# Patient Record
Sex: FEMALE | Race: BLACK OR AFRICAN AMERICAN | NOT HISPANIC OR LATINO | Employment: OTHER | URBAN - METROPOLITAN AREA
[De-identification: names, ages, dates, MRNs, and addresses within clinical notes are randomized per-mention and may not be internally consistent; named-entity substitution may affect disease eponyms.]

---

## 2017-01-03 DIAGNOSIS — R76.8 ANA POSITIVE: Primary | ICD-10-CM

## 2017-01-10 ENCOUNTER — ANESTHESIA EVENT (OUTPATIENT)
Dept: SURGERY | Facility: HOSPITAL | Age: 82
End: 2017-01-10
Payer: MEDICARE

## 2017-01-10 ENCOUNTER — HOSPITAL ENCOUNTER (OUTPATIENT)
Dept: CARDIOLOGY | Facility: CLINIC | Age: 82
Discharge: HOME | End: 2017-01-10
Payer: MEDICARE

## 2017-01-10 ENCOUNTER — OFFICE VISIT (OUTPATIENT)
Dept: UROLOGY | Facility: CLINIC | Age: 82
End: 2017-01-10
Payer: MEDICARE

## 2017-01-10 VITALS — HEIGHT: 63 IN | WEIGHT: 183.88 LBS | BODY MASS INDEX: 32.58 KG/M2

## 2017-01-10 DIAGNOSIS — N28.89 RENAL MASS, RIGHT: ICD-10-CM

## 2017-01-10 DIAGNOSIS — E11.29 TYPE 2 DIABETES MELLITUS WITH OTHER KIDNEY COMPLICATION, UNSPECIFIED LONG TERM INSULIN USE STATUS: ICD-10-CM

## 2017-01-10 DIAGNOSIS — N20.1 RIGHT URETERAL STONE: ICD-10-CM

## 2017-01-10 DIAGNOSIS — N39.0 COMPLICATED UTI (URINARY TRACT INFECTION): Primary | ICD-10-CM

## 2017-01-10 DIAGNOSIS — Z01.818 PREOP TESTING: ICD-10-CM

## 2017-01-10 LAB
BILIRUB SERPL-MCNC: NORMAL MG/DL
BLOOD URINE, POC: NORMAL
COLOR, POC UA: NORMAL
GLUCOSE UR QL STRIP: NORMAL
KETONES UR QL STRIP: NORMAL
LEUKOCYTE ESTERASE URINE, POC: NORMAL
NITRITE, POC UA: NORMAL
PH, POC UA: 5
PROTEIN, POC: NORMAL
SPECIFIC GRAVITY, POC UA: 1.01
UROBILINOGEN, POC UA: NORMAL

## 2017-01-10 PROCEDURE — 87086 URINE CULTURE/COLONY COUNT: CPT

## 2017-01-10 PROCEDURE — 99999 PR PBB SHADOW E&M-EST. PATIENT-LVL III: CPT | Mod: PBBFAC,,, | Performed by: UROLOGY

## 2017-01-10 PROCEDURE — 1159F MED LIST DOCD IN RCRD: CPT | Mod: S$GLB,,, | Performed by: UROLOGY

## 2017-01-10 PROCEDURE — 81001 URINALYSIS AUTO W/SCOPE: CPT | Mod: S$GLB,,, | Performed by: UROLOGY

## 2017-01-10 PROCEDURE — 1160F RVW MEDS BY RX/DR IN RCRD: CPT | Mod: S$GLB,,, | Performed by: UROLOGY

## 2017-01-10 PROCEDURE — 87186 SC STD MICRODIL/AGAR DIL: CPT

## 2017-01-10 PROCEDURE — 87077 CULTURE AEROBIC IDENTIFY: CPT

## 2017-01-10 PROCEDURE — 1126F AMNT PAIN NOTED NONE PRSNT: CPT | Mod: S$GLB,,, | Performed by: UROLOGY

## 2017-01-10 PROCEDURE — 99215 OFFICE O/P EST HI 40 MIN: CPT | Mod: 25,S$GLB,, | Performed by: UROLOGY

## 2017-01-10 PROCEDURE — 93000 ELECTROCARDIOGRAM COMPLETE: CPT | Mod: S$GLB,,, | Performed by: INTERNAL MEDICINE

## 2017-01-10 PROCEDURE — 87088 URINE BACTERIA CULTURE: CPT

## 2017-01-10 PROCEDURE — 1157F ADVNC CARE PLAN IN RCRD: CPT | Mod: S$GLB,,, | Performed by: UROLOGY

## 2017-01-10 RX ORDER — CEPHALEXIN 500 MG/1
500 CAPSULE ORAL EVERY 12 HOURS
Qty: 20 CAPSULE | Refills: 0 | Status: SHIPPED | OUTPATIENT
Start: 2017-01-10 | End: 2017-01-12

## 2017-01-10 NOTE — ANESTHESIA PREPROCEDURE EVALUATION
Anesthesia Assessment: Preoperative EQUATION     Planned Procedure: Procedure(s) (LRB):  EXTRACTION-STONE-URETEROSCOPY (Right)  LITHOTRIPSY-LASER (Right)  REPLACEMENT-STENT (Right)  REMOVAL-STENT (Right)  PYELOGRAM-RETROGRADE (Right)  Requested Anesthesia Type:General  Surgeon: Malka Blair MD  Service: Urology  Known or anticipated Date of Surgery:1/16/2017     Surgeon notes: reviewed     Electronic QUestionnaire Assessment completed via nurse interview with patient.         Sakshi Marcial [[id number]] - 84 y.o. Female         Providers Outside of [facility]             Pre-admit from 1/16/2017 in [facility]      Has outside PCP   Yes [[facility]]      Has other outside provider   Yes        Surgical Risk Level       Surgical Risk Level:   2              caRDScore (Clinical Anesthesia Rapid Decision Score)         Low  Total Score: 14       14 Sum of Clinical Scores        caRDScores (Grouped)       caRDScore - Ane:   1                       caRDScore - CVD:   2                       caRDScore - Pul:   3                       caRDScore - Met:   8                       caRDScore - Phy:   0              caRDScore Items             Pre-admit from 1/16/2017 in [facility]      Anesthesia        Do you wake up frequently with an arm or leg temporarily numb or asleep?   Yes      CVD        Activity similar to best ability for maximal activity or exercise   METS 4      Diagnosed with high blood pressure   Yes      Typical BP runs <150/90   Yes      Known heart murmur / could be due to valve abnormality   Yes [mitral calcification, systolic murmur]      Pulmonary        Total smoking adds up to 20 - 40 years   Yes      During most of those years, smoked 1 pack per day   Yes [1/2 pk/day]      Metabolic        Has kidney problem, followed by a nephrologist   Yes      Has been diagnosed with CKD   Yes      Type 2 Diabetes   Yes      Is on oral Rx and/or non-insulin injectable for  diabetes   No [Was taken off meds. BS good]      Has hyperlipoproteinemia   Yes      Physiologic        Obesity Status   Mild Obesity (BMI 30-34.9)        Flags       Red Flag Score:   2                       Yellow Flag Score:   6              Red Flags             Pre-admit from 1/16/2017 in [facility]      Obesity Status   Mild Obesity (BMI 30-34.9)      Has kidney problem, followed by a nephrologist   Yes      Has been diagnosed with CKD   Yes        Yellow Flags             Pre-admit from 1/16/2017 in [facility]      Has had surgery within the last 3 months   Yes      Takes herbal medications or vitamin supplements   Yes      Obesity Status   Mild Obesity (BMI 30-34.9)      Is or has been a Smoker   Yes      Aspirin   Yes      Known heart murmur / could be due to valve abnormality   Yes [mitral calcification, systolic murmur]      Do you wake up frequently with an arm or leg temporarily numb or asleep?   Yes        PONV Risk Score (assumes periop narcotic use = +1, Max=4)       PONV Risk Score:   3              PONV Risk Factors  Total Score: 2       1 Female      1 Non-Smoker at present        Sleep Apnea  Total Score: 0         KAREN STOP-Bang Risk Factors (Max=8)  Total Score: 2       1 Takes medication for high blood pressure      1 Age >50        KAREN Risk Level - 1 (Low), 2 (Moderate), 3 (High)       KAREN Risk Level:   1              RCRI (Revised Cardiac Risk Indices of ACC/AHA guidelines, Max=6)  Total Score: 0         CAD Risk Factors  Total Score: 5       1 Female. Age >55      1 Total smoking adds up to 20 - 40 years      1 Diagnosed with high blood pressure      1 Has Diabetes      1 Has hyperlipoproteinemia        CHADS Score if applicable (history of atrial fib/flutter, Max=6)  Total Score: 3       1 Age >75      1 Diagnosed with high blood pressure      1 Has Diabetes        Maximal Exercise Capacity             [facility]      Maximal Exercise Capacity   METS 4        Summary of Dependence  Total Score: 1       1 Is totally independent of others for activities of daily living        Phone Fraility Score (Max = 17)  Total Score: 3       1 Uses 5 or more meds on reg basis      1 Describes health as Fair      1 Has a problem losing control of urine        Pain Factors       No data to display        Risk Triggers (Evidence-Based Risk Triggers)         Pulmonary Risk Triggers  Total Score: 3       1 Age > or = 60      1 Obesity Status: Mild Obesity (BMI 30-34.9)      1 Total smoking adds up to 20 - 40 years        Renal Risk Triggers  Total Score: 4       1 Diagnosed with high blood pressure      1 Has kidney problem, followed by a nephrologist      1 Has been diagnosed with CKD      1 Type 2 Diabetes        Delirium Risk Triggers  Total Score: 1       1 Age > or = 75        Urologic Risk Triggers  Total Score: 1       1 Has a problem losing control of urine        Logistics         Pre-op Clinic Logistics  Total Score: 10       1 Has had surgery within the last 3 months      1 Has outside PCP      1 Has other outside provider      2 Takes herbal medications or vitamin supplements      1 Has had anesthesia, either as adult or as a child      1 Takes medication for high blood pressure      1 Known heart murmur / could be due to valve abnormality      1 Has kidney problem, followed by a nephrologist      1 Has been diagnosed with CKD        DOSC Logistics  Total Score: 1       1 Has been diagnosed with CKD        Discharge Logistics  Total Score: 0         Discharge Planning             Pre-admit from 1/16/2017 in [facility]      Discharge Planning        Discharge plans   Home with assistance      Will assist patient 24/7, if needed   -- []        Anes <For office use only>       Total Score: 12          Surgical Risk Level Assessment                       Triage considerations:      The patient  has no apparent active cardiac condition (No unstable coronary Syndrome such as severe unstable angina or recent [<1 month] myocardial infarction, decompensated CHF, severe valvular disease or significant arrhythmia)     Previous anesthesia records:[facility] 12/16/16     Last PCP note: within 1 month , outside [facility]  1/9/17  Subspecialty notes: Nephrology     Other important co-morbidities: HTN, HLD, CKD, DM-2, Aortic Atherosclerosis      Tests already available:  Available tests, within 1 month , within [facility] . 12/2016 CBC, CMP, Mag, X-Ray Abd, CT Abd/Pelvis, US Renal Artery/Vein. See Media for more outside records.  Instructions given. (See in Nurse's note)     Optimization:      Plan:   Testing: EKG  Patient has previously scheduled Medical Appointment: 1/10 Dr Blair-Urology     Navigation: Tests Scheduled. EKG 1/10                       1/11 EKG results reviewed      Results will be tracked by Preop Clinic.     Shanice Jimenez RN                                                                                                                     01/10/2017  Sakshi Marcial is a 84 y.o., female.    [facility] Anesthesia Evaluation    I have reviewed the Patient Summary Reports.    I have reviewed the Nursing Notes.   I have reviewed the Medications.     Review of Systems  Anesthesia Hx:  No problems with previous Anesthesia    Cardiovascular:   Hypertension    Renal/:   Chronic Renal Disease    Endocrine:   Diabetes, type 2        Physical Exam  General:  Well nourished    Airway/Jaw/Neck:  Airway Findings: Mallampati: III TM Distance: Normal, at least 6 cm      Dental:  Dental Findings: Edentulous   Chest/Lungs:  Chest/Lungs Findings: Normal Respiratory Rate     Heart/Vascular:  Heart Findings: Rate: Normal  Rhythm: Regular Rhythm        Mental Status:  Mental Status Findings:  Cooperative         Anesthesia Plan  Type of Anesthesia, risks & benefits discussed:  Anesthesia Type:  general  Patient's Preference:    Intra-op Monitoring Plan:   Intra-op Monitoring Plan Comments:   Post Op Pain Control Plan:   Post Op Pain Control Plan Comments:   Induction:   IV  Beta Blocker:  Patient is not currently on a Beta-Blocker (No further documentation required).       Informed Consent: Patient understands risks and agrees with Anesthesia plan.  Questions answered. Anesthesia consent signed with patient.  ASA Score: 2     Day of Surgery Review of History & Physical:    H&P update referred to the surgeon.         Ready For Surgery From Anesthesia Perspective.

## 2017-01-10 NOTE — LETTER
January 10, 2017      Carmel Sanchez MD  [address] [facility] [address]           [facility] - Urology 4th Floor  [address]  [facility] [address]  Phone: [phone]          Patient: Sakshi Marcial   MR Number: [id number]   YOB: 1932   Date of Visit: 1/10/2017       Dear Dr. Carmel Sanchez:    Thank you for referring Sakshi Marcial to me for evaluation. Attached you will find relevant portions of my assessment and plan of care.    If you have questions, please do not hesitate to call me. I look forward to following Sakshi Marcial along with you.    Sincerely,    Malka Blair MD    Enclosure  CC:  No Recipients    If you would like to receive this communication electronically, please contact [email or web address] or [phone] to request more information on [employer] access.    For providers and/or their staff who would like to refer a patient to [facility], please contact us through our one-stop-shop provider referral line, [facility], at [phone].    If you feel you have received this communication in error or would no longer like to receive these types of communications, please e-mail [email or web address]

## 2017-01-10 NOTE — PROGRESS NOTES
Subjective:       Patient ID: Sakshi Marcial is a 84 y.o. female.    Chief Complaint: Pre-op Exam    HPI Comments: Sakshi Marcial is a 84 y.o. Female here to discuss surgery. She is s/p a right ureteral stent placement on 12/16/16 for a right 7mm ureteral stone.  Culture was e.coli, pan sensitive. Had ARF. Last cr here was 1.8.  Films reviewed and on saved fluoro images, I could not see the stone.     Also, has right renal mass, 1.3cm mid pole.      On Vit D.   No previous history of stones. She is drinking more water.  Was drinking a lot of iced tea and soft drinks.   Coffee on occasion.  Good bit of salt.   No MVI.  No tums or rolaids.       History reviewed. No pertinent past surgical history.    Past Medical History   Diagnosis Date    Hyperlipidemia     Hypertension        Social History     Social History    Marital status:      Spouse name: N/A    Number of children: N/A    Years of education: N/A     Occupational History    Not on file.     Social History Main Topics    Smoking status: Never Smoker    Smokeless tobacco: Not on file    Alcohol use No    Drug use: No    Sexual activity: Not on file     Other Topics Concern    Not on file     Social History Narrative       History reviewed. No pertinent family history.    Current Outpatient Prescriptions   Medication Sig Dispense Refill    aspirin 81 MG Chew Take 81 mg by mouth once daily.      atorvastatin (LIPITOR) 40 MG tablet Take 40 mg by mouth once daily.      cholecalciferol, vitamin D3, (VITAMIN D3) 400 unit Chew Take 400 mg by mouth once daily.      ergocalciferol (VITAMIN D2) 50,000 unit Cap Take 50,000 Units by mouth every 7 days.      losartan-hydrochlorothiazide 100-12.5 mg (HYZAAR) 100-12.5 mg Tab Take 1 tablet by mouth once daily.      polyethylene glycol (GLYCOLAX) 17 gram PwPk Take 17 g by mouth once daily. 30 packet 0     No current facility-administered medications for this visit.        Review of patient's allergies  indicates:  No Known Allergies    Review of Systems   Constitutional: Negative for chills, fever and unexpected weight change.   HENT: Negative for nosebleeds.    Eyes: Negative for visual disturbance.   Respiratory: Negative for chest tightness.    Cardiovascular: Negative for chest pain.   Gastrointestinal: Negative for diarrhea.   Genitourinary: Positive for frequency and urgency. Negative for dysuria, hematuria and nocturia.   Musculoskeletal: Negative for myalgias.   Skin: Negative for rash.   Neurological: Negative for seizures.   Hematological: Does not bruise/bleed easily.   Psychiatric/Behavioral: Negative for behavioral problems.       Objective:      Physical Exam   Vitals reviewed.  Constitutional: She is oriented to person, place, and time. She appears well-developed and well-nourished.   HENT:   Head: Normocephalic and atraumatic.   Eyes: No scleral icterus.   Cardiovascular: Normal rate and regular rhythm.    Pulmonary/Chest: Effort normal. No respiratory distress.   Abdominal: She exhibits no mass.   Musculoskeletal: She exhibits no tenderness.   Lymphadenopathy:     She has no cervical adenopathy.   Neurological: She is alert and oriented to person, place, and time.   Skin: Skin is warm and dry. No rash noted.     Psychiatric: She has a normal mood and affect.     urine dip nitrite positive.   Assessment:       1. Complicated UTI (urinary tract infection)    2. Right ureteral stone    3. Renal mass, right    4. Type 2 diabetes mellitus with other kidney complication, unspecified long term insulin use status        Plan:     urine culture.   Keflex start today.  The patient is scheduled for ureteroscopy. The risks and benefits of ureteroscopy were discussed with the patient in detail.  Consent was obtained.  The risks include but are not limited to burning with urination, bleeding, infection, pain, incomplete fragmentation of the stone, need for further procedures, injury to the kidney, ureter,  bladder and need for open surgery.  The patient was informed that they may require a ureteral stent and that stents can cause irritative voiding symptoms.  They also understand that ureteral stents are temporary and must be removed or exchanged in a timely fashion as they can calcify and make more stones and become difficult to remove. Alternative treatments were also discussed with the patient in detail to include ESWL, percutaneous treatment of the stone, open surgery or observation. Patient understands these risks and has agreed to proceed with surgery.    I spent 40 minutes with the patient of which more than half was spent in direct consultation with the patient in regards to our treatment and plan.

## 2017-01-12 ENCOUNTER — TELEPHONE (OUTPATIENT)
Dept: UROLOGY | Facility: CLINIC | Age: 82
End: 2017-01-12

## 2017-01-12 LAB — BACTERIA UR CULT: NORMAL

## 2017-01-12 RX ORDER — AMOXICILLIN AND CLAVULANATE POTASSIUM 875; 125 MG/1; MG/1
1 TABLET, FILM COATED ORAL 2 TIMES DAILY
Qty: 20 TABLET | Refills: 0 | Status: SHIPPED | OUTPATIENT
Start: 2017-01-12 | End: 2017-01-12

## 2017-01-13 ENCOUNTER — TELEPHONE (OUTPATIENT)
Dept: UROLOGY | Facility: CLINIC | Age: 82
End: 2017-01-13

## 2017-01-13 NOTE — TELEPHONE ENCOUNTER
Called pt to confirm arrival time of 12 noon for procedure. Gave pt NPO instructions and gave pt opportunity to ask questions. Pt verbalized understanding.

## 2017-01-16 ENCOUNTER — ANESTHESIA (OUTPATIENT)
Dept: SURGERY | Facility: HOSPITAL | Age: 82
End: 2017-01-16
Payer: MEDICARE

## 2017-01-16 PROBLEM — N20.9 UROLITHIASIS: Status: ACTIVE | Noted: 2017-01-16

## 2017-01-16 PROCEDURE — 25000003 PHARM REV CODE 250: Performed by: NURSE ANESTHETIST, CERTIFIED REGISTERED

## 2017-01-16 PROCEDURE — D9220A PRA ANESTHESIA: Mod: CRNA,,, | Performed by: NURSE ANESTHETIST, CERTIFIED REGISTERED

## 2017-01-16 PROCEDURE — D9220A PRA ANESTHESIA: Mod: ANES,,, | Performed by: ANESTHESIOLOGY

## 2017-01-16 PROCEDURE — 63600175 PHARM REV CODE 636 W HCPCS: Performed by: NURSE ANESTHETIST, CERTIFIED REGISTERED

## 2017-01-16 PROCEDURE — 25000003 PHARM REV CODE 250: Performed by: STUDENT IN AN ORGANIZED HEALTH CARE EDUCATION/TRAINING PROGRAM

## 2017-01-16 RX ORDER — PROPOFOL 10 MG/ML
VIAL (ML) INTRAVENOUS
Status: DISCONTINUED | OUTPATIENT
Start: 2017-01-16 | End: 2017-01-16

## 2017-01-16 RX ORDER — ONDANSETRON 2 MG/ML
INJECTION INTRAMUSCULAR; INTRAVENOUS
Status: DISCONTINUED | OUTPATIENT
Start: 2017-01-16 | End: 2017-01-16

## 2017-01-16 RX ORDER — FENTANYL CITRATE 50 UG/ML
INJECTION, SOLUTION INTRAMUSCULAR; INTRAVENOUS
Status: DISCONTINUED | OUTPATIENT
Start: 2017-01-16 | End: 2017-01-16

## 2017-01-16 RX ORDER — PHENYLEPHRINE HYDROCHLORIDE 10 MG/ML
INJECTION INTRAVENOUS
Status: DISCONTINUED | OUTPATIENT
Start: 2017-01-16 | End: 2017-01-16

## 2017-01-16 RX ORDER — LIDOCAINE HYDROCHLORIDE 10 MG/ML
INJECTION, SOLUTION INTRAVENOUS
Status: DISCONTINUED | OUTPATIENT
Start: 2017-01-16 | End: 2017-01-16

## 2017-01-16 RX ORDER — GLYCOPYRROLATE 0.2 MG/ML
INJECTION INTRAMUSCULAR; INTRAVENOUS
Status: DISCONTINUED | OUTPATIENT
Start: 2017-01-16 | End: 2017-01-16

## 2017-01-16 RX ORDER — NEOSTIGMINE METHYLSULFATE 1 MG/ML
INJECTION, SOLUTION INTRAVENOUS
Status: DISCONTINUED | OUTPATIENT
Start: 2017-01-16 | End: 2017-01-16

## 2017-01-16 RX ORDER — ROCURONIUM BROMIDE 10 MG/ML
INJECTION, SOLUTION INTRAVENOUS
Status: DISCONTINUED | OUTPATIENT
Start: 2017-01-16 | End: 2017-01-16

## 2017-01-16 RX ORDER — SODIUM CHLORIDE 9 MG/ML
INJECTION, SOLUTION INTRAVENOUS CONTINUOUS PRN
Status: DISCONTINUED | OUTPATIENT
Start: 2017-01-16 | End: 2017-01-16

## 2017-01-16 RX ORDER — SUCCINYLCHOLINE CHLORIDE 20 MG/ML
INJECTION INTRAMUSCULAR; INTRAVENOUS
Status: DISCONTINUED | OUTPATIENT
Start: 2017-01-16 | End: 2017-01-16

## 2017-01-16 RX ADMIN — Medication 2 G: at 02:01

## 2017-01-16 RX ADMIN — SODIUM CHLORIDE: 0.9 INJECTION, SOLUTION INTRAVENOUS at 01:01

## 2017-01-16 RX ADMIN — PHENYLEPHRINE HYDROCHLORIDE 100 MCG: 10 INJECTION INTRAVENOUS at 02:01

## 2017-01-16 RX ADMIN — NEOSTIGMINE METHYLSULFATE 5 MG: 1 INJECTION INTRAVENOUS at 03:01

## 2017-01-16 RX ADMIN — FENTANYL CITRATE 50 MCG: 50 INJECTION, SOLUTION INTRAMUSCULAR; INTRAVENOUS at 02:01

## 2017-01-16 RX ADMIN — ROCURONIUM BROMIDE 10 MG: 10 INJECTION, SOLUTION INTRAVENOUS at 02:01

## 2017-01-16 RX ADMIN — SODIUM CHLORIDE, SODIUM ACETATE ANHYDROUS, SODIUM GLUCONATE, POTASSIUM CHLORIDE, AND MAGNESIUM CHLORIDE: 526; 222; 502; 37; 30 INJECTION, SOLUTION INTRAVENOUS at 03:01

## 2017-01-16 RX ADMIN — LIDOCAINE HYDROCHLORIDE 50 MG: 10 INJECTION, SOLUTION INTRAVENOUS at 02:01

## 2017-01-16 RX ADMIN — SUCCINYLCHOLINE CHLORIDE 120 MG: 20 INJECTION, SOLUTION INTRAMUSCULAR; INTRAVENOUS at 02:01

## 2017-01-16 RX ADMIN — ONDANSETRON 4 MG: 2 INJECTION INTRAMUSCULAR; INTRAVENOUS at 03:01

## 2017-01-16 RX ADMIN — PROPOFOL 150 MG: 10 INJECTION, EMULSION INTRAVENOUS at 02:01

## 2017-01-16 RX ADMIN — GLYCOPYRROLATE 0.6 MG: 0.2 INJECTION, SOLUTION INTRAMUSCULAR; INTRAVENOUS at 03:01

## 2017-01-16 NOTE — ANESTHESIA RELEASE NOTE
"Anesthesia Release from PACU Note    Patient: Sakshi Marcial    Procedure(s) Performed: Procedure(s) (LRB):  EXTRACTION-STONE-URETEROSCOPY (Right)  LITHOTRIPSY-LASER (Right)  REPLACEMENT-STENT (Right)  REMOVAL-STENT (Right)    Anesthesia type: GEN    Post pain: Adequate analgesia reported    Post assessment: no apparent anesthetic complications, tolerated procedure well and no evidence of recall    Post vital signs:   Visit Vitals    /68    Pulse 62    Temp 36.2 °C (97.1 °F) (Temporal)    Resp 18    Ht 5' 3" (1.6 m)    Wt 83.4 kg (183 lb 13.8 oz)    SpO2 100%    Breastfeeding No    BMI 32.57 kg/m2       Level of consciousness: awake, alert and oriented    Nausea/Vomiting: no nausea/no vomiting    Complications: none    Airway Patency: patent    Respiratory: unassisted, spontaneous ventilation, room air    Cardiovascular: stable and blood pressure at baseline    Hydration: euvolemic    "

## 2017-01-16 NOTE — ANESTHESIA POSTPROCEDURE EVALUATION
"Anesthesia Post Evaluation    Patient: Sakshi Marcial    Procedure(s) Performed: Procedure(s) (LRB):  EXTRACTION-STONE-URETEROSCOPY (Right)  LITHOTRIPSY-LASER (Right)  REPLACEMENT-STENT (Right)  REMOVAL-STENT (Right)    Final Anesthesia Type: general  Patient location during evaluation: PACU  Patient participation: Yes- Able to Participate  Level of consciousness: awake and alert and oriented  Post-procedure vital signs: reviewed and stable  Pain management: adequate  Airway patency: patent  PONV status at discharge: No PONV  Anesthetic complications: no      Cardiovascular status: hemodynamically stable  Respiratory status: unassisted, spontaneous ventilation and room air  Hydration status: euvolemic  Follow-up not needed.        Visit Vitals    /68    Pulse 62    Temp 36.2 °C (97.1 °F) (Temporal)    Resp 18    Ht 5' 3" (1.6 m)    Wt 83.4 kg (183 lb 13.8 oz)    SpO2 100%    Breastfeeding No    BMI 32.57 kg/m2       Pain/Koby Score: Pain Assessment Performed: Yes (1/16/2017  3:19 PM)  Presence of Pain: denies (1/16/2017  3:38 PM)  Koby Score: 10 (1/16/2017  3:38 PM)      "

## 2017-01-17 ENCOUNTER — TELEPHONE (OUTPATIENT)
Dept: UROLOGY | Facility: CLINIC | Age: 82
End: 2017-01-17

## 2017-01-17 DIAGNOSIS — N20.0 KIDNEY STONES: Primary | ICD-10-CM

## 2017-01-17 NOTE — TELEPHONE ENCOUNTER
----- Message from Gricelda Sánchez LPN sent at 1/17/2017  8:11 AM CST -----  I do not have orders. You may not have anything in two weeks. Please advise   ----- Message -----     From: Irene Jenkins MD     Sent: 1/16/2017   3:30 PM       To: Malka Blair MD, #    Hey Gricelda,   Can you please schedule this patient for stent removal in 2 weeks?    Thanks!  Irene Lake in

## 2017-02-01 ENCOUNTER — HOSPITAL ENCOUNTER (OUTPATIENT)
Dept: UROLOGY | Facility: HOSPITAL | Age: 82
Discharge: HOME | End: 2017-02-01
Attending: UROLOGY | Admitting: UROLOGY
Payer: MEDICARE

## 2017-02-01 VITALS
HEIGHT: 62 IN | HEART RATE: 73 BPM | WEIGHT: 184.06 LBS | RESPIRATION RATE: 18 BRPM | TEMPERATURE: 98 F | BODY MASS INDEX: 33.87 KG/M2 | DIASTOLIC BLOOD PRESSURE: 67 MMHG | SYSTOLIC BLOOD PRESSURE: 154 MMHG

## 2017-02-01 DIAGNOSIS — N20.0 KIDNEY STONES: ICD-10-CM

## 2017-02-01 DIAGNOSIS — N28.89 RENAL MASS, RIGHT: Primary | ICD-10-CM

## 2017-02-01 LAB
BILIRUB SERPL-MCNC: NORMAL MG/DL
BLOOD URINE, POC: NORMAL
COLOR, POC UA: YELLOW
GLUCOSE UR QL STRIP: NORMAL
KETONES UR QL STRIP: NORMAL
LEUKOCYTE ESTERASE URINE, POC: NORMAL
NITRITE, POC UA: NORMAL
PH, POC UA: 5
PROTEIN, POC: NORMAL
SPECIFIC GRAVITY, POC UA: 1.01
UROBILINOGEN, POC UA: NORMAL

## 2017-02-01 PROCEDURE — 52310 CYSTOSCOPY AND TREATMENT: CPT

## 2017-02-01 PROCEDURE — 52310 CYSTOSCOPY AND TREATMENT: CPT | Mod: ,,, | Performed by: UROLOGY

## 2017-02-01 RX ORDER — DOXYCYCLINE HYCLATE 100 MG
100 TABLET ORAL
Status: COMPLETED | OUTPATIENT
Start: 2017-02-01 | End: 2017-02-01

## 2017-02-01 RX ORDER — LIDOCAINE HYDROCHLORIDE 20 MG/ML
10 JELLY TOPICAL
Status: COMPLETED | OUTPATIENT
Start: 2017-02-01 | End: 2017-02-01

## 2017-02-01 RX ADMIN — LIDOCAINE HYDROCHLORIDE 10 ML: 20 JELLY TOPICAL at 11:02

## 2017-02-01 RX ADMIN — Medication 100 MG: at 11:02

## 2017-02-01 NOTE — PATIENT INSTRUCTIONS

## 2017-02-01 NOTE — IP AVS SNAPSHOT
[facility]  [address] [address]  Phone: [phone]  Fax: [phone]                  Sakshi Marcial   2017 10:45 AM   Cysto and Stent Removal    Description:  Female : 11/15/1932   Provider:  ISABEL WEAVERY   Department:  [facility]           Visit Information     Date & Time Provider Department    2017 10:45 AM [facility]      Recent Lab Values        2016 2016 1/10/2017 1/10/2017                 11:59 AM  6:18 PM  3:47 PM  8:48 PM        Urine Culture ESCHERICHIA COLI  &gt;100,000 cfu/ml   Multiple organisms isolated. None in predominance.  Repeat if - ESCHERICHIA COLI  &gt;100,000 cfu/ml            clinically necessary.          Color Yellow - - -        Specific Gravity 1.010 - 1.010 -        pH 6.0 - 5 -        Leukocytes - - + -        Blood - - trace -        Nitrite Positive (A) - n -        Ketones Negative - n -        Bilirubin - - n -        Urobilinogen Negative - n -        Protein - - n -        Glucose - - n -                 Reason for Visit     Nephrolithiasis           Diagnoses this Visit        Comments    Renal mass, right    -  Primary     Kidney stones                To Do List           Your Scheduled Appointments     Mar 09, 2017  9:00 AM CST   Consult with MD [facility] - Rheumatology ([facility] )    [address]  [place] [address] [phone]              Goals (5 Years of Data)     None           Medications                ** Verify the list of medication(s) below is accurate and up to date. Carry this with you in case of emergency. If your medications have changed, please notify your healthcare provider.             Medication List      TAKE these medications        Additional Info                      acetaminophen 500 MG tablet   Commonly known as:  TYLENOL   Refills:  0   Dose:  500 mg    Instructions:  Take  500 mg by mouth every 6 (six) hours as needed for Pain.     Begin Date    AM    Noon    PM    Bedtime       aspirin 81 MG Chew   Refills:  0   Dose:  81 mg    Instructions:  Take 81 mg by mouth once daily.     Begin Date    AM    Noon    PM    Bedtime       atorvastatin 40 MG tablet   Commonly known as:  LIPITOR   Refills:  0   Dose:  40 mg    Instructions:  Take 40 mg by mouth once daily.     Begin Date    AM    Noon    PM    Bedtime       losartan-hydrochlorothiazide 100-12.5 mg 100-12.5 mg Tab   Commonly known as:  HYZAAR   Refills:  0   Dose:  1 tablet    Instructions:  Take 1 tablet by mouth once daily.     Begin Date    AM    Noon    PM    Bedtime       oxycodone-acetaminophen 5-325 mg per tablet   Commonly known as:  PERCOCET   Quantity:  21 tablet   Refills:  0   Dose:  1 tablet    Instructions:  Take 1 tablet by mouth every 4 (four) hours as needed for Pain.     Begin Date    AM    Noon    PM    Bedtime       * polyethylene glycol 17 gram Pwpk   Commonly known as:  GLYCOLAX   Quantity:  30 packet   Refills:  0   Dose:  17 g    Instructions:  Take 17 g by mouth once daily.     Begin Date    AM    Noon    PM    Bedtime       * polyethylene glycol 17 gram Pwpk   Commonly known as:  GLYCOLAX   Quantity:  30 packet   Refills:  0   Dose:  17 g    Instructions:  Take 17 g by mouth once daily.     Begin Date    AM    Noon    PM    Bedtime       tamsulosin 0.4 mg Cp24   Commonly known as:  FLOMAX   Quantity:  30 capsule   Refills:  0   Dose:  0.4 mg    Instructions:  Take 1 capsule (0.4 mg total) by mouth once daily.     Begin Date    AM    Noon    PM    Bedtime       VITAMIN D2 50,000 unit Cap   Refills:  0   Dose:  65456 Units   Generic drug:  ergocalciferol    Instructions:  Take 50,000 Units by mouth every 7 days.     Begin Date    AM    Noon    PM    Bedtime       VITAMIN D3 400 unit Chew   Refills:  0   Dose:  400 mg   Generic drug:  cholecalciferol (vitamin D3)    Instructions:  Take 400 mg by mouth once  "daily.     Begin Date    AM    Noon    PM    Bedtime       * Notice:  This list has 2 medication(s) that are the same as other medications prescribed for you. Read the directions carefully, and ask your doctor or other care provider to review them with you.            Your Vitals Were     BP Pulse Temp Resp Height Weight    143/68 77 98.2 °F (36.8 °C) (Oral) 18 5' 2" (1.575 m) 83.5 kg (184 lb 1.4 oz)    BMI                33.67 kg/m2          Allergies as of 2/1/2017     No Known Allergies      Immunizations Administered on Date of Encounter - 2/1/2017     None      Orders Placed During Today's Visit      Normal Orders This Visit    CYSTOSCOPY W/ STENT REMOVAL     POCT urinalysis, dipstick or tablet reag     Future Labs/Procedures Expected by Expires    US Retroperitoneal Complete (Kidney and  5/1/2017 2/1/2018      Instructions    What to Expect After a Cystoscopy with Stent Removal  For the next 24-48 hours, you may feel a mild burning when you urinate. This burning is normal and expected. Drink plenty of water to dilute the urine to help relieve the burning sensation. You may also see a small amount of blood in your urine after the procedure.    Unless you are already taking antibiotics, you may be given an antibiotic after the test to prevent infection.    Signs and Symptoms to Report  Call the [facility] at [phone] if you develop any of the following:  · Fever of 101 degrees or higher  · Chills or persistent bleeding  · Inability to urinate    After hours or on weekends, you may reach a urology resident on call at this number: [phone].       Advance Directives     An advance directive is a document which, in the event you are no longer able to make decisions for yourself, tells your healthcare team what kind of treatment you do or do not want to receive, or who you would like to make those decisions for you.  If you do not currently have an advance directive, [facility] encourages you to " create one.  For more information call:  [phone]), [phone] ([phone]),  or log on to [email or web address].        [facility]     [facility] Care [facility] - 24/7 Assistance  Registered nurses in the [facility] provide clinical advisement, health education, appointment booking, and other advisory services.  Call for this free service at [phone].        [facility] Sign-Up     Activating your [facility] account is as easy as 1-2-3!     1) Visit [email or web address], select Sign Up Now, enter this activation code and your date of birth, then select Next.  [email or web address]  Expires: 3/18/2017 11:14 AM      2) Create a username and password to use when you visit [facility] in the future and select a security question in case you lose your password and select Next.    3) Enter your e-mail address and click Sign Up!    Additional Information  If you have questions, please e-mail [email or web address] or call [phone] to talk to our [facility] staff. Remember, [facility] is NOT to be used for urgent needs. For medical emergencies, dial 911.         Language Assistance Services     ATTENTION: Language assistance services are available, free of charge. Please call [phone].      ATENCIÓN: Si habla español, tiene a french disposición servicios gratuitos de asistencia lingüística. Llame al [phone].     CHÚ Ý: N?u b?n nói Ti?ng Vi?t, có các d?ch v? h? tr? ngôn ng? mi?n phí dành cho b?n. G?i s? [phone].         [facility] complies with applicable Federal civil rights laws and does not discriminate on the basis of race, color, national origin, age, disability, or sex.

## 2017-02-01 NOTE — PROCEDURES
Procedure: Flexible cysto-uretheroscopy and stent removal   Pre Procedure Diagnosis:s/pureteroscopy  Post Procedure Diagnosis:same   Surgeon: Malka Blair MD   Anesthesia: 2% uro-jet lidocaine jelly for local analgesia   Flexible cysto-urethroscopy was performed after consent was obtained. The risks and benefits were explained.   2% lidocaine urojet was used for local analgesia.   The genitalia was prepped and draped in the sterile fashion with betadine.   The flexible scope was advanced into the urethra and into the bladder. The stent was removed without difficulty.   The patient tolerated the procedure well without complication.   They will follow with a renal ultrasound for renal mass and kidney stones.

## 2017-02-01 NOTE — INTERVAL H&P NOTE
The patient has been examined and the H&P has been reviewed:    I concur with the findings and these changes have occurred since H&P was written.  S/p right ureteroscopy 1/16/17. Here for stent removal.        There are no hospital problems to display for this patient.

## 2017-05-01 ENCOUNTER — LAB VISIT (OUTPATIENT)
Dept: LAB | Facility: HOSPITAL | Age: 82
End: 2017-05-01
Attending: INTERNAL MEDICINE
Payer: MEDICARE

## 2017-05-01 DIAGNOSIS — E11.65 UNCONTROLLED TYPE 2 DIABETES MELLITUS WITH CHRONIC KIDNEY DISEASE, WITHOUT LONG-TERM CURRENT USE OF INSULIN, UNSPECIFIED CKD STAGE: ICD-10-CM

## 2017-05-01 DIAGNOSIS — N39.0 UTI (URINARY TRACT INFECTION), UNCOMPLICATED: ICD-10-CM

## 2017-05-01 DIAGNOSIS — R80.9 PROTEINURIA: ICD-10-CM

## 2017-05-01 DIAGNOSIS — E11.22 UNCONTROLLED TYPE 2 DIABETES MELLITUS WITH CHRONIC KIDNEY DISEASE, WITHOUT LONG-TERM CURRENT USE OF INSULIN, UNSPECIFIED CKD STAGE: ICD-10-CM

## 2017-05-01 DIAGNOSIS — N13.30 HYDRONEPHROSIS, UNSPECIFIED HYDRONEPHROSIS TYPE: ICD-10-CM

## 2017-05-01 DIAGNOSIS — N28.89 RENAL MASS, RIGHT: ICD-10-CM

## 2017-05-01 DIAGNOSIS — I10 ESSENTIAL HYPERTENSION: ICD-10-CM

## 2017-05-01 LAB
ALBUMIN SERPL BCP-MCNC: 3.6 G/DL
ANION GAP SERPL CALC-SCNC: 6 MMOL/L
BASOPHILS # BLD AUTO: 0.01 K/UL
BASOPHILS NFR BLD: 0.3 %
BUN SERPL-MCNC: 18 MG/DL
CALCIUM SERPL-MCNC: 10.7 MG/DL
CHLORIDE SERPL-SCNC: 111 MMOL/L
CO2 SERPL-SCNC: 26 MMOL/L
CREAT SERPL-MCNC: 1.4 MG/DL
DIFFERENTIAL METHOD: NORMAL
EOSINOPHIL # BLD AUTO: 0 K/UL
EOSINOPHIL NFR BLD: 1 %
ERYTHROCYTE [DISTWIDTH] IN BLOOD BY AUTOMATED COUNT: 14.2 %
EST. GFR  (AFRICAN AMERICAN): 39.8 ML/MIN/1.73 M^2
EST. GFR  (NON AFRICAN AMERICAN): 34.5 ML/MIN/1.73 M^2
GLUCOSE SERPL-MCNC: 108 MG/DL
HCT VFR BLD AUTO: 37.6 %
HGB BLD-MCNC: 12.3 G/DL
LYMPHOCYTES # BLD AUTO: 1.1 K/UL
LYMPHOCYTES NFR BLD: 27.2 %
MAGNESIUM SERPL-MCNC: 2 MG/DL
MCH RBC QN AUTO: 30.7 PG
MCHC RBC AUTO-ENTMCNC: 32.7 %
MCV RBC AUTO: 94 FL
MONOCYTES # BLD AUTO: 0.3 K/UL
MONOCYTES NFR BLD: 7.9 %
NEUTROPHILS # BLD AUTO: 2.5 K/UL
NEUTROPHILS NFR BLD: 63.6 %
PHOSPHATE SERPL-MCNC: 2.7 MG/DL
PLATELET # BLD AUTO: 187 K/UL
PMV BLD AUTO: 12.1 FL
POTASSIUM SERPL-SCNC: 3.9 MMOL/L
RBC # BLD AUTO: 4.01 M/UL
SODIUM SERPL-SCNC: 143 MMOL/L
WBC # BLD AUTO: 3.93 K/UL

## 2017-05-01 PROCEDURE — 85025 COMPLETE CBC W/AUTO DIFF WBC: CPT

## 2017-05-01 PROCEDURE — 36415 COLL VENOUS BLD VENIPUNCTURE: CPT

## 2017-05-01 PROCEDURE — 83735 ASSAY OF MAGNESIUM: CPT

## 2017-05-01 PROCEDURE — 86255 FLUORESCENT ANTIBODY SCREEN: CPT

## 2017-05-01 PROCEDURE — 80069 RENAL FUNCTION PANEL: CPT

## 2017-05-03 LAB
ANCA AB TITR SER IF: NORMAL TITER
P-ANCA TITR SER IF: NORMAL TITER

## 2017-05-08 ENCOUNTER — OFFICE VISIT (OUTPATIENT)
Dept: NEPHROLOGY | Facility: CLINIC | Age: 82
End: 2017-05-08
Payer: MEDICARE

## 2017-05-08 VITALS
DIASTOLIC BLOOD PRESSURE: 60 MMHG | HEIGHT: 62 IN | WEIGHT: 182.75 LBS | BODY MASS INDEX: 33.63 KG/M2 | SYSTOLIC BLOOD PRESSURE: 138 MMHG | HEART RATE: 80 BPM | OXYGEN SATURATION: 97 %

## 2017-05-08 DIAGNOSIS — N20.1 RIGHT URETERAL STONE: ICD-10-CM

## 2017-05-08 DIAGNOSIS — E83.52 HYPERCALCEMIA: ICD-10-CM

## 2017-05-08 DIAGNOSIS — N28.89 RENAL MASS, RIGHT: Primary | ICD-10-CM

## 2017-05-08 DIAGNOSIS — N39.0 COMPLICATED UTI (URINARY TRACT INFECTION): ICD-10-CM

## 2017-05-08 DIAGNOSIS — E21.3 HYPERPARATHYROIDISM: ICD-10-CM

## 2017-05-08 DIAGNOSIS — I10 ESSENTIAL HYPERTENSION: ICD-10-CM

## 2017-05-08 DIAGNOSIS — R80.9 PROTEINURIA, UNSPECIFIED TYPE: ICD-10-CM

## 2017-05-08 PROCEDURE — 1159F MED LIST DOCD IN RCRD: CPT | Mod: S$GLB,,, | Performed by: INTERNAL MEDICINE

## 2017-05-08 PROCEDURE — 99999 PR PBB SHADOW E&M-EST. PATIENT-LVL III: CPT | Mod: PBBFAC,,, | Performed by: INTERNAL MEDICINE

## 2017-05-08 PROCEDURE — 1126F AMNT PAIN NOTED NONE PRSNT: CPT | Mod: S$GLB,,, | Performed by: INTERNAL MEDICINE

## 2017-05-08 PROCEDURE — 3075F SYST BP GE 130 - 139MM HG: CPT | Mod: S$GLB,,, | Performed by: INTERNAL MEDICINE

## 2017-05-08 PROCEDURE — 99213 OFFICE O/P EST LOW 20 MIN: CPT | Mod: S$GLB,,, | Performed by: INTERNAL MEDICINE

## 2017-05-08 PROCEDURE — 3078F DIAST BP <80 MM HG: CPT | Mod: S$GLB,,, | Performed by: INTERNAL MEDICINE

## 2017-05-08 PROCEDURE — 1160F RVW MEDS BY RX/DR IN RCRD: CPT | Mod: S$GLB,,, | Performed by: INTERNAL MEDICINE

## 2017-05-08 RX ORDER — FUROSEMIDE 20 MG/1
20 TABLET ORAL 2 TIMES DAILY
COMMUNITY
End: 2019-09-06 | Stop reason: CLARIF

## 2017-05-08 RX ORDER — AMLODIPINE BESYLATE 10 MG/1
10 TABLET ORAL DAILY
COMMUNITY

## 2017-05-08 NOTE — PROGRESS NOTES
Subjective:       Patient ID: Sakshi Marcial is a 84 y.o. Black or  female who presents for f/u evaluation of renal function.    HPI H/o HTN, NIDDM  Without retinopathy, recurrent UTIs, last UTI one month ago associated with n&v  s/p cataract removal, seen in an outside clinic.  She comes with out records, labs and is not sure of her medications.  PSH: cataracts bilateral, hysterectomy/oopherectomies  SH:  1/2 PPD  30 yrs  Stopped 3 years  FH:  DM,   ALL; NKA    Interval hx:     R hydronephrosis , stone and renal mass seen by urology, S/p cystoscopy and stent with stone removal. Serum crt down to 1.4,  Serum calcium 11.1- 10.7no LUTS, dysuria.   She is not aware that she needs to f/u for further eval of renal mass with urology.    + CRISTO 1:160 and + SSb antibody,  ).13 UPRCT, 24 hr urine for protein not completed.  + general arthralgias at time  UA + WBC 20 pHPF and bacteria, asymptomatic.    No SOB, CP, leg swelling. Her bp at home is 120-130 systolic usually and she took her meds this am, but rushed to make her appointment on time.    Review of Systems   Constitutional: Negative for appetite change, chills, fatigue, fever and unexpected weight change.   HENT: Negative for congestion, facial swelling, hearing loss, nosebleeds and trouble swallowing.    Eyes: Negative for pain, discharge, redness and visual disturbance.   Respiratory: Negative for cough, chest tightness, shortness of breath and wheezing.    Cardiovascular: Negative for chest pain, palpitations and leg swelling.   Gastrointestinal: Negative for abdominal pain, constipation, diarrhea, nausea and vomiting.   Endocrine: Negative for cold intolerance, heat intolerance and polydipsia.   Genitourinary: Negative for decreased urine volume, difficulty urinating, dysuria, flank pain, hematuria and urgency.   Musculoskeletal: Negative for arthralgias, back pain, joint swelling and myalgias.   Skin: Negative for color change, pallor, rash and  "wound.   Neurological: Negative for dizziness, tremors, seizures, syncope, speech difficulty, weakness and headaches.   Hematological: Negative for adenopathy. Does not bruise/bleed easily.   Psychiatric/Behavioral: Negative for agitation, behavioral problems, dysphoric mood, self-injury and sleep disturbance.       Objective:     Blood pressure 138/60, pulse 80, height 5' 2" (1.575 m), weight 82.9 kg (182 lb 12.2 oz), SpO2 97 %.  Blood pressure 138/60, pulse 80, height 5' 2" (1.575 m), weight 82.9 kg (182 lb 12.2 oz), SpO2 97 %.      Physical Exam   Constitutional: She is oriented to person, place, and time. She appears well-developed and well-nourished. No distress.   HENT:   Head: Normocephalic and atraumatic.   Mouth/Throat: No oropharyngeal exudate.   Eyes: Conjunctivae and EOM are normal. Pupils are equal, round, and reactive to light. Right eye exhibits no discharge. Left eye exhibits no discharge. No scleral icterus.   Neck: Normal range of motion. Neck supple. No JVD present. No tracheal deviation present. No thyromegaly present.   Cardiovascular: Normal rate, regular rhythm and intact distal pulses.  Exam reveals no gallop.    Murmur heard.  2/6 early AYANNA at LSB  No peripheral edema   DP +1  bilateral   Pulmonary/Chest: Effort normal and breath sounds normal. No stridor. No respiratory distress. She has no wheezes. She has no rales. She exhibits no tenderness.   Abdominal: Soft. Bowel sounds are normal. She exhibits no distension and no mass. There is no tenderness. There is no rebound and no guarding. No hernia.   Musculoskeletal: She exhibits no edema or tenderness.   Lymphadenopathy:     She has no cervical adenopathy.   Neurological: She is alert and oriented to person, place, and time. She exhibits normal muscle tone.   Skin: Skin is warm and dry. No rash noted. She is not diaphoretic. No erythema.   Psychiatric: She has a normal mood and affect. Judgment and thought content normal.   Nursing note and " vitals reviewed.      Assessment:       1. Renal mass, right    2. Right ureteral stone    3. Complicated UTI (urinary tract infection)    4. Proteinuria, unspecified type    5. Essential hypertension    6. Hypercalcemia    7. Hyperparathyroidism        Plan:      83 yo AAF here for evaluation of renal function with NIDDM, HTN, HPL, and d/c smoking in the lst 3 years, but no known h/o renal disease.  With a h/o  recurrent UTIs., R hydorureter secondary to nephrolithiasis, now resolved after stent/retreveial with improvement in renal function serum crt 1.8--> 1.4    Nephrolithiasis: 95% calcium oxalate with hypercalcemia, check TH, ionized calcium and 24 hr stone risk profile. 12/2016 at time of stone her stone her serum immunofixation was negative for monoclonal protein.   Increase po fluids to > 2.5 L daily and drink into the night    Hypercalcemia: for now will continue HCTZ,but this may need to be stopped, check pth, ionized calcium and urinary calcium excretion, vit d denied by medicare  Hold po vit d at this time    Renal mass repeat US, f/u urology     CHEYENNE imroved serum crt 1.4    + CRISTO SSB+  With minimal proteinuria  Rheumatology referral, but unless significant proteinuria, and with a h/o Dm to explain minimal proteinuria, would not perform renal bx at this time    1. CKD: stage 3 improved     Lab Results   Component Value Date    CREATININE 1.4 05/01/2017         Proteinuria: stable   Prot/Creat Ratio, Ur   Date Value Ref Range Status   05/01/2017 0.13 0.00 - 0.20 Final   12/16/2016 0.23 (H) 0.00 - 0.20 Final          HTN: self monitoring discussed      Medication: no change      Monitor BP as directed in instructions    Metabolic acidosis: none       Hyponatremia:       Hyperkalemia:     Lab Results   Component Value Date     05/01/2017    K 3.9 05/01/2017    CO2 26 05/01/2017         Renal osteodystrophy secondary hyperparathyroidism: w/u for hypercalcemia as above, hold vit d products for now   Lab  Results   Component Value Date    CALCIUM 10.7 (H) 05/01/2017    PHOS 2.7 05/01/2017        Anemia: none  Lab Results   Component Value Date    HGB 12.3 05/01/2017           DM:  Metformin max dose 1000mg daily with gfr <45, d/c for gfr < 30 ml/min               Avoid long acting sulfonylurea      Weight: Weight: 82.9 kg (182 lb 12.2 oz). she states that her daily weights are stable  Wt Readings from Last 3 Encounters:   05/08/17 82.9 kg (182 lb 12.2 oz)   02/01/17 83.5 kg (184 lb 1.4 oz)   01/16/17 83.4 kg (183 lb 13.8 oz)          Hyperlipidemia:   No results found for: LDLCALC      D iet:  Education/referral:       Sodium: 2 gm       Potassium:      Phosphorus: 1000 mg       Protein:      Fluid: >2 L daily       ESRD planing: not indicated at this time       Education:       Access:     PCP followup:     Referrals: rheumatology      Please avoid or minimize all NSAIDS (ibuprofen, motrin, aleve, indocin, naprosyn) to minimize the risk to your kidneys.        Althea avoid and minimize use of all Proton Pump inhibitors (such as nexium prilosec, omeprazole, pantroprazole, protonix)and Please speak with your PCP about switching to H2 blocker such as Pepcid        Follow up in: 3 months, labs and 24hr urine in the next several weeks, rneal us and urology f/u in next several weeks

## 2017-05-08 NOTE — PATIENT INSTRUCTIONS
1. Labs: stone risk profile and pth, vit d , uric acid in 2-3 weeks, US in 2-4 weeks  Other labs in 3 months prior to next visit  2.  Medications: no change    3. Referrals: follow up with urologist about right renal mass  rheumatologist    4. Follow up with PCP regardin. BP:  Take BP and pulse  twice daily for one week, record              Bring results  to next visit.              Goal :   <140/90    6. Diet:  National Kidney Foundation:  www.[email or web address].org       Sodium: < 2000 milligrams daily including all food and drink      (one teaspoon of table salt has 2300 milligrams of sodium)         Phosphorus: <1000mg daily    Mediterranean  diet    Vaccines: please check with your PCP and be sure to have an annual flu vaccine and keep up to date with your pneumococcal vaccine for pneumonia      Please avoid or minimize all NSAIDS (ibuprofen, motrin, aleve, indocin, naprosyn, BC powder, mobic, relafen, alleve, and any others) to minimize the risk to your kidneys    Please avoid Proton pump inhibitors unless specifically necessary and speak with your PCP about alternatives such as H2 blockers     Return to clinic:  3 months

## 2017-05-08 NOTE — MR AVS SNAPSHOT
[facility]  [address]  [facility] [address]  Phone: [phone]  Fax: [phone]                  Sakshi Marcial   2017 3:00 PM   Office Visit    Description:  Female : 11/15/1932   Provider:  Carmel Sanchez MD   Department:  [facility]           Diagnoses this Visit        Comments    Renal mass, right    -  Primary     Right ureteral stone         Complicated UTI (urinary tract infection)         Proteinuria, unspecified type         Essential hypertension         Hypercalcemia         Hyperparathyroidism                To Do List           Future Appointments        Provider Department Dept Phone    2017 11:00 AM [facility] [address] [facility] [phone]      Goals (5 Years of Data)     None      Follow-Up and Disposition     Return in about 3 months (around 2017).      [facility]     [facility] Care [facility] -  Assistance  Unless otherwise directed by your provider, please contact [facility], our nurse care line that is available for  assistance.     Registered nurses in the [facility] provide: appointment scheduling, clinical advisement, health education, and other advisory services.  Call: [phone] (toll free)               Medications                Verify that the below list of medications is an accurate representation of the medications you are currently taking.  If none reported, the list may be blank. If incorrect, please contact your healthcare provider. Carry this list with you in case of emergency.           Current Medications     amlodipine (NORVASC) 5 MG tablet Take 5 mg by mouth once daily.    aspirin 81 MG Chew Take 81 mg by mouth once daily.    atorvastatin (LIPITOR) 40 MG tablet Take 40 mg by mouth once daily.    cholecalciferol, vitamin D3, (VITAMIN D3) 400 unit Chew Take 400 mg by mouth once daily.    furosemide (LASIX) 20 MG tablet Take 20 mg by mouth 2 (two) times  "daily.    losartan-hydrochlorothiazide 100-12.5 mg (HYZAAR) 100-12.5 mg Tab Take 1 tablet by mouth once daily.    polyethylene glycol (GLYCOLAX) 17 gram PwPk Take 17 g by mouth once daily.    acetaminophen (TYLENOL) 500 MG tablet Take 500 mg by mouth every 6 (six) hours as needed for Pain.    ergocalciferol (VITAMIN D2) 50,000 unit Cap Take 50,000 Units by mouth every 7 days.    oxycodone-acetaminophen (PERCOCET) 5-325 mg per tablet Take 1 tablet by mouth every 4 (four) hours as needed for Pain.    polyethylene glycol (GLYCOLAX) 17 gram PwPk Take 17 g by mouth once daily.    tamsulosin (FLOMAX) 0.4 mg Cp24 Take 1 capsule (0.4 mg total) by mouth once daily.           Clinical Reference Information           Your Vitals Were     BP Pulse Height Weight SpO2 BMI    160/78 80 5' 2" (1.575 m) 82.9 kg (182 lb 12.2 oz) 97% 33.43 kg/m2      Blood Pressure          Most Recent Value    BP  (!)  160/78      Allergies as of 5/8/2017     No Known Allergies      Immunizations Administered on Date of Encounter - 5/8/2017     None      Orders Placed During Today's Visit     Future Labs/Procedures Expected by Expires    CRISTO  5/8/2017 7/7/2018    ANTI-DNA ANTIBODY, DOUBLE-STRANDED  5/8/2017 7/7/2018    ANTICARDIOLIPIN IGA  5/8/2017 7/7/2018    ANTIPHOSPHOLIPID AB (ANTICARDIOLIPIN)  5/8/2017 7/7/2018    Microalbumin/creatinine urine ratio  5/8/2017 7/7/2018    PTH, intact  5/8/2017 7/7/2018    Uric acid  5/8/2017 7/7/2018    US Retroperitoneal Complete  5/8/2017 5/8/2018    Protein / creatinine ratio, urine  8/6/2017 5/8/2018    Renal function panel  8/6/2017 5/8/2018    Urinalysis  8/6/2017 5/8/2018      [facility] Sign-Up     Activating your [facility] account is as easy as 1-2-3!     1) Visit [email or web address], select Sign Up Now, enter this activation code and your date of birth, then select Next.  [email or web address]  Expires: 6/22/2017  4:17 PM      2) Create a username and password to use when you visit [facility] in the future and " select a security question in case you lose your password and select Next.    3) Enter your e-mail address and click Sign Up!    Additional Information  If you have questions, please e-mail [email or web address] or call [phone] to talk to our [facility] staff. Remember, [facility] is NOT to be used for urgent needs. For medical emergencies, dial 911.         Instructions      1. Labs: stone risk p;rofile and pth, vit d , uric acid in 2-3 weeks, US in 2-4 weeks  Other labs in 3 months prior to next visit  2.  Medications: no change    3. Referrals: follow up with urologist about right renal mass  rheumatologist    4. Follow up with PCP regardin. BP:  Take BP and pulse  twice daily for one week, record              Bring results  to next visit.              Goal :   <140/90    6. Diet:  National Kidney Foundation:  www.[email or web address].org       Sodium: < 2000 milligrams daily including all food and drink      (one teaspoon of table salt has 2300 milligrams of sodium)         Phosphorus: <1000mg daily    Mediterranean  diet    Vaccines: please check with your PCP and be sure to have an annual flu vaccine and keep up to date with your pneumococcal vaccine for pneumonia      Please avoid or minimize all NSAIDS (ibuprofen, motrin, aleve, indocin, naprosyn, BC powder, mobic, relafen, alleve, and any others) to minimize the risk to your kidneys    Please avoid Proton pump inhibitors unless specifically necessary and speak with your PCP about alternatives such as H2 blockers     Return to clinic:  3 months       Language Assistance Services     ATTENTION: Language assistance services are available, free of charge. Please call [phone].      ATENCIÓN: Si habla español, tiene a french disposición servicios gratuitos de asistencia lingüística. Llame al [phone].     CHÚ Ý: N?u b?n nói Ti?ng Vi?t, có các d?ch v? h? tr? ngôn ng? mi?n phí dành cho b?n. G?i s? [phone].         [facility] complies with  applicable Federal civil rights laws and does not discriminate on the basis of race, color, national origin, age, disability, or sex.

## 2017-05-10 ENCOUNTER — TELEPHONE (OUTPATIENT)
Dept: NEPHROLOGY | Facility: CLINIC | Age: 82
End: 2017-05-10

## 2017-05-15 DIAGNOSIS — N28.89 RENAL MASS, RIGHT: Primary | ICD-10-CM

## 2017-05-29 ENCOUNTER — HOSPITAL ENCOUNTER (OUTPATIENT)
Dept: RADIOLOGY | Facility: HOSPITAL | Age: 82
Discharge: HOME | End: 2017-05-29
Attending: INTERNAL MEDICINE
Payer: MEDICARE

## 2017-05-29 DIAGNOSIS — N28.89 RENAL MASS, RIGHT: ICD-10-CM

## 2017-05-29 DIAGNOSIS — I10 ESSENTIAL HYPERTENSION: ICD-10-CM

## 2017-05-29 DIAGNOSIS — R80.9 PROTEINURIA, UNSPECIFIED TYPE: ICD-10-CM

## 2017-05-29 DIAGNOSIS — N20.1 RIGHT URETERAL STONE: ICD-10-CM

## 2017-05-29 DIAGNOSIS — N39.0 COMPLICATED UTI (URINARY TRACT INFECTION): ICD-10-CM

## 2017-05-29 PROCEDURE — 76770 US EXAM ABDO BACK WALL COMP: CPT | Mod: 26,,, | Performed by: RADIOLOGY

## 2017-05-29 PROCEDURE — 76770 US EXAM ABDO BACK WALL COMP: CPT | Mod: TC

## 2017-05-30 ENCOUNTER — TELEPHONE (OUTPATIENT)
Dept: NEPHROLOGY | Facility: CLINIC | Age: 82
End: 2017-05-30

## 2017-07-25 ENCOUNTER — HOSPITAL ENCOUNTER (OUTPATIENT)
Dept: RADIOLOGY | Facility: HOSPITAL | Age: 82
Discharge: HOME | End: 2017-07-25
Attending: UROLOGY
Payer: MEDICARE

## 2017-07-25 ENCOUNTER — OFFICE VISIT (OUTPATIENT)
Dept: UROLOGY | Facility: CLINIC | Age: 82
End: 2017-07-25
Payer: MEDICARE

## 2017-07-25 VITALS
SYSTOLIC BLOOD PRESSURE: 109 MMHG | DIASTOLIC BLOOD PRESSURE: 60 MMHG | BODY MASS INDEX: 33.47 KG/M2 | HEIGHT: 62 IN | WEIGHT: 181.88 LBS | HEART RATE: 70 BPM

## 2017-07-25 DIAGNOSIS — N39.0 COMPLICATED UTI (URINARY TRACT INFECTION): ICD-10-CM

## 2017-07-25 DIAGNOSIS — N28.89 RENAL MASS, RIGHT: Primary | ICD-10-CM

## 2017-07-25 DIAGNOSIS — E11.29 TYPE 2 DIABETES MELLITUS WITH OTHER KIDNEY COMPLICATION, UNSPECIFIED LONG TERM INSULIN USE STATUS: ICD-10-CM

## 2017-07-25 DIAGNOSIS — N20.0 CALCULUS OF KIDNEY: ICD-10-CM

## 2017-07-25 PROCEDURE — 1126F AMNT PAIN NOTED NONE PRSNT: CPT | Mod: S$GLB,,, | Performed by: UROLOGY

## 2017-07-25 PROCEDURE — 74000 XR ABDOMEN AP 1 VIEW: CPT | Mod: 26,,, | Performed by: RADIOLOGY

## 2017-07-25 PROCEDURE — 99999 PR PBB SHADOW E&M-EST. PATIENT-LVL III: CPT | Mod: PBBFAC,,, | Performed by: UROLOGY

## 2017-07-25 PROCEDURE — 74000 XR ABDOMEN AP 1 VIEW: CPT | Mod: TC

## 2017-07-25 PROCEDURE — 1159F MED LIST DOCD IN RCRD: CPT | Mod: S$GLB,,, | Performed by: UROLOGY

## 2017-07-25 PROCEDURE — 99214 OFFICE O/P EST MOD 30 MIN: CPT | Mod: S$GLB,,, | Performed by: UROLOGY

## 2017-07-25 NOTE — PROGRESS NOTES
Subjective:       Patient ID: Sakshi Marcial is a 84 y.o. female.    Chief Complaint: Follow-up (had ultrasound last week)    Sakshi Marcial is a 84 y.o. Female here for follow up of stones.   She is s/p right ureteroscopy 1/16/17 and stent removal 2/17.  (She presented with infected stone and had stent placement prior).    Culture was e.coli, pan sensitive. Had ARF.   CKD, GFR 39. She sees Dr. Pisano    Also, has right renal mass, 1.3cm mid pole.      On Vit D.   No previous history of stones. She is drinking more water.  Was drinking a lot of iced tea and soft drinks.   Coffee on occasion.  Good bit of salt.   No MVI.  No tums or rolaids.       Dr. Pisano gave her antibiotics. No culture. Urine micro showed many bacteria. 7 WBC.  Nitrite negative. 3+ glucose. It was a voided specimen.   Her complaint was yellow urine. No dysuria. No increased urinary frequency, urgency.   No definitive UTI.             History reviewed. No pertinent surgical history.    Past Medical History:   Diagnosis Date    Hyperlipidemia     Hypertension     Type 2 diabetes mellitus with chronic kidney disease        Social History     Social History    Marital status:      Spouse name: N/A    Number of children: N/A    Years of education: N/A     Occupational History    Not on file.     Social History Main Topics    Smoking status: Never Smoker    Smokeless tobacco: Not on file    Alcohol use No    Drug use: No    Sexual activity: Not on file     Other Topics Concern    Not on file     Social History Narrative    No narrative on file       History reviewed. No pertinent family history.    Current Outpatient Prescriptions   Medication Sig Dispense Refill    acetaminophen (TYLENOL) 500 MG tablet Take 500 mg by mouth every 6 (six) hours as needed for Pain.      amlodipine (NORVASC) 5 MG tablet Take 5 mg by mouth once daily.      aspirin 81 MG Chew Take 81 mg by mouth once daily.      atorvastatin (LIPITOR) 40 MG  tablet Take 40 mg by mouth once daily.      cholecalciferol, vitamin D3, (VITAMIN D3) 400 unit Chew Take 400 mg by mouth once daily.      furosemide (LASIX) 20 MG tablet Take 20 mg by mouth 2 (two) times daily.      losartan-hydrochlorothiazide 100-12.5 mg (HYZAAR) 100-12.5 mg Tab Take 1 tablet by mouth once daily.      oxycodone-acetaminophen (PERCOCET) 5-325 mg per tablet Take 1 tablet by mouth every 4 (four) hours as needed for Pain. 21 tablet 0    polyethylene glycol (GLYCOLAX) 17 gram PwPk Take 17 g by mouth once daily. 30 packet 0    polyethylene glycol (GLYCOLAX) 17 gram PwPk Take 17 g by mouth once daily. 30 packet 0    tamsulosin (FLOMAX) 0.4 mg Cp24 Take 1 capsule (0.4 mg total) by mouth once daily. 30 capsule 0     No current facility-administered medications for this visit.        Review of patient's allergies indicates:  No Known Allergies    Review of Systems   Constitutional: Negative for chills, fever and unexpected weight change.   HENT: Negative for nosebleeds.    Eyes: Negative for visual disturbance.   Respiratory: Negative for chest tightness.    Cardiovascular: Negative for chest pain.   Gastrointestinal: Negative for diarrhea.   Genitourinary: Positive for frequency and urgency. Negative for dysuria, hematuria and nocturia.   Musculoskeletal: Negative for myalgias.   Skin: Negative for rash.   Neurological: Negative for seizures.   Hematological: Does not bruise/bleed easily.   Psychiatric/Behavioral: Negative for behavioral problems.       Objective:      Physical Exam   Vitals reviewed.  Constitutional: She is oriented to person, place, and time. She appears well-developed and well-nourished.   HENT:   Head: Normocephalic and atraumatic.   Eyes: No scleral icterus.   Cardiovascular: Normal rate and regular rhythm.    Pulmonary/Chest: Effort normal. No respiratory distress.   Abdominal: She exhibits no mass.   Musculoskeletal: She exhibits no tenderness.   Lymphadenopathy:     She has  no cervical adenopathy.   Neurological: She is alert and oriented to person, place, and time.   Skin: Skin is warm and dry. No rash noted.     Psychiatric: She has a normal mood and affect.     urine dip clear  Assessment:       1. Renal mass, right    2. Complicated UTI (urinary tract infection)    3. Calculus of kidney    4. Type 2 diabetes mellitus with other kidney complication, unspecified long term insulin use status        Plan:     urine culture. Only treat if culture positive.   Home collect urine culture orders in for future UTI  KUB today to see if there is a stone on right. KUB in 6 months.   Renal mass stable. Repeat ultrasound in 6 months. Natural history of small renal masses discussed.     All films personally reviewed.     I spent 25 minutes with the patient of which more than half was spent in direct consultation with the patient in regards to our treatment and plan.

## 2017-07-28 LAB — BACTERIA UR CULT: NORMAL

## 2017-07-31 ENCOUNTER — TELEPHONE (OUTPATIENT)
Dept: UROLOGY | Facility: HOSPITAL | Age: 82
End: 2017-07-31

## 2017-07-31 DIAGNOSIS — N20.0 KIDNEY STONE: Primary | ICD-10-CM

## 2017-07-31 RX ORDER — DOXYCYCLINE 100 MG/1
100 CAPSULE ORAL 2 TIMES DAILY
Qty: 14 CAPSULE | Refills: 0 | Status: SHIPPED | OUTPATIENT
Start: 2017-07-31 | End: 2017-08-07

## 2017-08-01 ENCOUNTER — TELEPHONE (OUTPATIENT)
Dept: UROLOGY | Facility: CLINIC | Age: 82
End: 2017-08-01

## 2017-08-01 NOTE — TELEPHONE ENCOUNTER
----- Message from Aleta Fletcher LPN sent at 8/1/2017  8:36 AM CDT -----   Home # disconnected. Cell no answer, LM.  ----- Message -----  From: Malka Blair MD  Sent: 7/31/2017  10:00 AM  To: [facility] Staff    Sent med for UTI. She should pick it up today. Is she having symptoms?

## 2017-08-16 ENCOUNTER — HOSPITAL ENCOUNTER (OUTPATIENT)
Dept: RADIOLOGY | Facility: HOSPITAL | Age: 82
Discharge: HOME | End: 2017-08-16
Attending: UROLOGY
Payer: MEDICARE

## 2017-08-16 ENCOUNTER — HOSPITAL ENCOUNTER (OUTPATIENT)
Dept: RADIOLOGY | Facility: HOSPITAL | Age: 82
Discharge: HOME | End: 2017-08-16
Attending: INTERNAL MEDICINE
Payer: MEDICARE

## 2017-08-16 DIAGNOSIS — N28.89 RENAL MASS, RIGHT: ICD-10-CM

## 2017-08-16 DIAGNOSIS — N20.0 KIDNEY STONE: ICD-10-CM

## 2017-08-16 DIAGNOSIS — E83.52 HYPERCALCEMIA: Primary | ICD-10-CM

## 2017-08-16 PROCEDURE — 76770 US EXAM ABDO BACK WALL COMP: CPT | Mod: TC

## 2017-08-16 PROCEDURE — 76770 US EXAM ABDO BACK WALL COMP: CPT | Mod: 26,,, | Performed by: RADIOLOGY

## 2017-08-16 PROCEDURE — 74176 CT ABD & PELVIS W/O CONTRAST: CPT | Mod: 26,,, | Performed by: RADIOLOGY

## 2017-08-16 PROCEDURE — 74176 CT ABD & PELVIS W/O CONTRAST: CPT | Mod: TC

## 2017-08-16 RX ORDER — LOSARTAN POTASSIUM 100 MG/1
100 TABLET ORAL DAILY
Qty: 90 TABLET | Refills: 3 | Status: SHIPPED | OUTPATIENT
Start: 2017-08-16 | End: 2020-03-06

## 2017-08-17 ENCOUNTER — TELEPHONE (OUTPATIENT)
Dept: UROLOGY | Facility: CLINIC | Age: 82
End: 2017-08-17

## 2017-08-17 DIAGNOSIS — N28.89 RENAL MASS, RIGHT: Primary | ICD-10-CM

## 2017-08-17 NOTE — TELEPHONE ENCOUNTER
----- Message from Malka Blair MD sent at 8/17/2017  6:23 AM CDT -----  Let patient know she does have a stone on the right. It is 6mm.  We will continue to follow it with the renal mass that we already knew about in 6 months. Please make sure she is in the hold screen.   She should also have a chest x ray and lft checked. Orders in.

## 2017-08-31 ENCOUNTER — OFFICE VISIT (OUTPATIENT)
Dept: NEPHROLOGY | Facility: CLINIC | Age: 82
End: 2017-08-31
Payer: MEDICARE

## 2017-08-31 ENCOUNTER — TELEPHONE (OUTPATIENT)
Dept: UROLOGY | Facility: CLINIC | Age: 82
End: 2017-08-31

## 2017-08-31 ENCOUNTER — HOSPITAL ENCOUNTER (OUTPATIENT)
Dept: RADIOLOGY | Facility: HOSPITAL | Age: 82
Discharge: HOME | End: 2017-08-31
Attending: UROLOGY
Payer: MEDICARE

## 2017-08-31 VITALS
OXYGEN SATURATION: 97 % | HEART RATE: 71 BPM | DIASTOLIC BLOOD PRESSURE: 50 MMHG | SYSTOLIC BLOOD PRESSURE: 110 MMHG | WEIGHT: 181.44 LBS | BODY MASS INDEX: 33.39 KG/M2 | HEIGHT: 62 IN

## 2017-08-31 DIAGNOSIS — I10 ESSENTIAL HYPERTENSION: ICD-10-CM

## 2017-08-31 DIAGNOSIS — N20.0 CALCULUS OF KIDNEY: ICD-10-CM

## 2017-08-31 DIAGNOSIS — E21.3 HYPERPARATHYROIDISM: ICD-10-CM

## 2017-08-31 DIAGNOSIS — R80.0 ISOLATED PROTEINURIA WITHOUT SPECIFIC MORPHOLOGIC LESION: ICD-10-CM

## 2017-08-31 DIAGNOSIS — E83.52 HYPERCALCEMIA: Primary | ICD-10-CM

## 2017-08-31 PROCEDURE — 3078F DIAST BP <80 MM HG: CPT | Mod: S$GLB,,, | Performed by: INTERNAL MEDICINE

## 2017-08-31 PROCEDURE — 74000 XR ABDOMEN AP 1 VIEW: CPT | Mod: 26,,, | Performed by: RADIOLOGY

## 2017-08-31 PROCEDURE — 1159F MED LIST DOCD IN RCRD: CPT | Mod: S$GLB,,, | Performed by: INTERNAL MEDICINE

## 2017-08-31 PROCEDURE — 74000 XR ABDOMEN AP 1 VIEW: CPT | Mod: TC

## 2017-08-31 PROCEDURE — 3074F SYST BP LT 130 MM HG: CPT | Mod: S$GLB,,, | Performed by: INTERNAL MEDICINE

## 2017-08-31 PROCEDURE — 3008F BODY MASS INDEX DOCD: CPT | Mod: S$GLB,,, | Performed by: INTERNAL MEDICINE

## 2017-08-31 PROCEDURE — 99213 OFFICE O/P EST LOW 20 MIN: CPT | Mod: S$GLB,,, | Performed by: INTERNAL MEDICINE

## 2017-08-31 PROCEDURE — 99999 PR PBB SHADOW E&M-EST. PATIENT-LVL III: CPT | Mod: PBBFAC,,, | Performed by: INTERNAL MEDICINE

## 2017-08-31 PROCEDURE — 1126F AMNT PAIN NOTED NONE PRSNT: CPT | Mod: S$GLB,,, | Performed by: INTERNAL MEDICINE

## 2017-08-31 RX ORDER — CINACALCET 30 MG/1
30 TABLET, FILM COATED ORAL
Qty: 30 TABLET | Refills: 11 | Status: SHIPPED | OUTPATIENT
Start: 2017-08-31 | End: 2019-03-13 | Stop reason: SDUPTHER

## 2017-08-31 NOTE — PATIENT INSTRUCTIONS
24 hr urine stone risk    Stop all vit d products, calcium products and any other vitamins and over the counter products.    Labs in the next 2 weeks and 24 hr urine results    RTC 8 weeks

## 2017-08-31 NOTE — TELEPHONE ENCOUNTER
----- Message from Malka Blair MD sent at 8/31/2017  1:36 PM CDT -----  No stones for sure on x ray

## 2017-08-31 NOTE — PROGRESS NOTES
Subjective:       Patient ID: Sakshi Marcial is a 84 y.o. Black or  female who presents for f/u evaluation of renal function.    HPI H/o HTN, NIDDM  Without retinopathy, recurrent UTIs, last UTI one month ago associated with n&v  s/p cataract removal, seen in an outside clinic.  She comes with out records, labs and is not sure of her medications.  PSH: cataracts bilateral, hysterectomy/oopherectomies  SH:  1/2 PPD  30 yrs  Stopped 3 years  FH:  DM,   ALL; NKA    Interval hx:     R hydronephrosis , stone and renal mass seen by urology, S/p cystoscopy and stent with stone removal. Serum crt down to 1.4,  Serum calcium 11.1- 10.7no LUTS, dysuria.  She now has another stone seen in the right kidney nonobstructing 6 mm pink followed by urology serum PTH is come back at 144, RACHANA related protein 0.4 I still do not have any urine studies . serum calcium remains about 11.  She is still taking vitamin D 2000 units once daily claims there is no other calcium product or vitamins that she is taking  She is not aware that she needs to f/u for further eval of renal mass with urology.    + CRISTO 1:160 and + SSb antibody,  ).13 UPRCT, 24 hr urine for protein not completed.  + general arthralgias at time  UA + WBC 20 pHPF and bacteria, asymptomatic.    No SOB, CP, leg swelling. Her bp at home is 120-130 systolic usually and she took her meds this am, but rushed to make her appointment on time.    Review of Systems   Constitutional: Negative for appetite change, chills, fatigue, fever and unexpected weight change.   HENT: Negative for congestion, facial swelling, hearing loss, nosebleeds and trouble swallowing.    Eyes: Negative for pain, discharge, redness and visual disturbance.   Respiratory: Negative for cough, chest tightness, shortness of breath and wheezing.    Cardiovascular: Negative for chest pain, palpitations and leg swelling.   Gastrointestinal: Negative for abdominal pain, constipation, diarrhea, nausea and  "vomiting.   Endocrine: Negative for cold intolerance, heat intolerance and polydipsia.   Genitourinary: Negative for decreased urine volume, difficulty urinating, dysuria, flank pain, hematuria and urgency.   Musculoskeletal: Negative for arthralgias, back pain, joint swelling and myalgias.   Skin: Negative for color change, pallor, rash and wound.   Neurological: Negative for dizziness, tremors, seizures, syncope, speech difficulty, weakness and headaches.   Hematological: Negative for adenopathy. Does not bruise/bleed easily.   Psychiatric/Behavioral: Negative for agitation, behavioral problems, dysphoric mood, self-injury and sleep disturbance.       Objective:     Blood pressure (!) 110/50, pulse 71, height 5' 2" (1.575 m), weight 82.3 kg (181 lb 7 oz), SpO2 97 %.  Blood pressure (!) 110/50, pulse 71, height 5' 2" (1.575 m), weight 82.3 kg (181 lb 7 oz), SpO2 97 %.      Physical Exam   Constitutional: She is oriented to person, place, and time. She appears well-developed and well-nourished. No distress.   HENT:   Head: Normocephalic and atraumatic.   Mouth/Throat: No oropharyngeal exudate.   Eyes: Conjunctivae and EOM are normal. Pupils are equal, round, and reactive to light. Right eye exhibits no discharge. Left eye exhibits no discharge. No scleral icterus.   Neck: Normal range of motion. Neck supple. No JVD present. No tracheal deviation present. No thyromegaly present.   Cardiovascular: Normal rate, regular rhythm and intact distal pulses.  Exam reveals no gallop.    Murmur heard.  2/6 early AYANNA at LSB  No peripheral edema   DP +1  bilateral   Pulmonary/Chest: Effort normal and breath sounds normal. No stridor. No respiratory distress. She has no wheezes. She has no rales. She exhibits no tenderness.   Abdominal: Soft. Bowel sounds are normal. She exhibits no distension and no mass. There is no tenderness. There is no rebound and no guarding. No hernia.   Musculoskeletal: She exhibits no edema or " tenderness.   Lymphadenopathy:     She has no cervical adenopathy.   Neurological: She is alert and oriented to person, place, and time. She exhibits normal muscle tone.   Skin: Skin is warm and dry. No rash noted. She is not diaphoretic. No erythema.   Psychiatric: She has a normal mood and affect. Judgment and thought content normal.   Nursing note and vitals reviewed.      Assessment:       1. Hypercalcemia    2. Isolated proteinuria without specific morphologic lesion    3. Essential hypertension    4. Hyperparathyroidism    5. Calculus of kidney        Plan:      85 yo AAF here for evaluation of renal function with NIDDM, HTN, HPL, and d/c smoking in the lst 3 years, but no known h/o renal disease.  With a h/o  recurrent UTIs., R hydorureter secondary to nephrolithiasis, now resolved after stent/retreveial with improvement in renal function serum crt 1.8--> 1.4    Nephrolithiasis: 95% calcium oxalate with hypercalcemia, check TH, ionized calcium and 24 hr stone risk profile. 12/2016 at time of stone her stone her serum immunofixation was negative for monoclonal protein.   Increase po fluids to > 2.5 L daily and drink into the night    Hypercalcemia: for now will continue HCTZ   PTH related protein 0.4  24-hour stone risk profile ordered patient told to stop vitamin D and all over-the-counter vitamins and calcium products.  We'll order a 125 vitamin D hydroxy level as well with elevated PTH and elevated calcium patient will likely require treatment for hyperparathyroidism,  with cinalcet hopefully this will not be too expensive for the patient.  I would like to get the 24-hour stone risk profile and demonstrate hypercalciuria prior to starting sent    Positive CRISTO with SSA pattern patient complains that she has some joint pain and knee pain occasional dry mouth and dry eyes.  This may need further evaluation I rheumatology  Proteinuria is stable and less than 300 mg continue to monitor serially      Renal  mass repeat US, f/u urology repeat ultrasound 6 month     CHEYENNE imroved serum crt 1.4     + CRISTO SSB+  With minimal proteinuria  Rheumatology referral, but unless significant proteinuria, and with a h/o Dm to explain minimal proteinuria, would not perform renal bx at this time    1. CKD: stage 3 improved     Lab Results   Component Value Date    CREATININE 1.4 08/16/2017         Proteinuria: stable   Prot/Creat Ratio, Ur   Date Value Ref Range Status   05/29/2017 0.21 (H) 0.00 - 0.20 Final   05/01/2017 0.13 0.00 - 0.20 Final   12/16/2016 0.23 (H) 0.00 - 0.20 Final          HTN: self monitoring discussed      Medication: no change      Monitor BP as directed in instructions    Metabolic acidosis: none       Hyponatremia:       Hyperkalemia:     Lab Results   Component Value Date     08/16/2017    K 3.8 08/16/2017    CO2 30 (H) 08/16/2017         Renal osteodystrophy secondary hyperparathyroidism: w/u for hypercalcemia as above, hold vit d products for now   Lab Results   Component Value Date    .0 (H) 08/16/2017    CALCIUM 11.0 (H) 08/16/2017    CAION 1.43 (H) 08/16/2017    PHOS 2.5 (L) 08/16/2017        Anemia: none  Lab Results   Component Value Date    HGB 12.1 05/29/2017           DM:  Metformin max dose 1000mg daily with gfr <45, d/c for gfr < 30 ml/min               Avoid long acting sulfonylurea      Weight: Weight: 82.3 kg (181 lb 7 oz). she states that her daily weights are stable  Wt Readings from Last 3 Encounters:   08/31/17 82.3 kg (181 lb 7 oz)   07/25/17 82.5 kg (181 lb 14.1 oz)   05/08/17 82.9 kg (182 lb 12.2 oz)          Hyperlipidemia: On a 12  No results found for: LDLCALC      D iet:  Education/referral:       Sodium: 2 gm       Potassium:      Phosphorus: 1000 mg       Protein:      Fluid: >2 L daily       ESRD planing: not indicated at this time       Education:       Access:     PCP followup:     Referrals: rheumatology      Please avoid or minimize all NSAIDS (ibuprofen, motrin,  aleve, indocin, naprosyn) to minimize the risk to your kidneys.        Althea avoid and minimize use of all Proton Pump inhibitors (such as nexium prilosec, omeprazole, pantroprazole, protonix)and Please speak with your PCP about switching to H2 blocker such as Pepcid        Follow up in: 8 weeks stone risk profile blood work in the next 2 weeks

## 2017-09-07 ENCOUNTER — LAB VISIT (OUTPATIENT)
Dept: LAB | Facility: HOSPITAL | Age: 82
End: 2017-09-07
Attending: INTERNAL MEDICINE
Payer: MEDICARE

## 2017-09-07 DIAGNOSIS — N20.0 CALCULUS OF KIDNEY: ICD-10-CM

## 2017-09-07 DIAGNOSIS — I10 ESSENTIAL HYPERTENSION: ICD-10-CM

## 2017-09-07 DIAGNOSIS — E83.52 HYPERCALCEMIA: ICD-10-CM

## 2017-09-07 DIAGNOSIS — R80.0 ISOLATED PROTEINURIA WITHOUT SPECIFIC MORPHOLOGIC LESION: ICD-10-CM

## 2017-09-07 DIAGNOSIS — E21.3 HYPERPARATHYROIDISM: ICD-10-CM

## 2017-09-07 PROCEDURE — 83735 ASSAY OF MAGNESIUM: CPT

## 2017-09-11 DIAGNOSIS — E83.52 HYPERCALCEMIA: Primary | ICD-10-CM

## 2017-12-12 ENCOUNTER — TELEPHONE (OUTPATIENT)
Dept: UROLOGY | Facility: CLINIC | Age: 82
End: 2017-12-12

## 2017-12-12 DIAGNOSIS — N20.0 NEPHROLITHIASIS: Primary | ICD-10-CM

## 2017-12-12 NOTE — TELEPHONE ENCOUNTER
----- Message from Jasmine Perera sent at 12/12/2017 10:51 AM CST -----  Contact: Pt #[phone]  Pt states she have some questions about her Lab before her Appt 1/23 Pt would like to speak to the nurse #[phone]

## 2018-01-15 ENCOUNTER — HOSPITAL ENCOUNTER (OUTPATIENT)
Dept: RADIOLOGY | Facility: HOSPITAL | Age: 83
Discharge: HOME | End: 2018-01-15
Attending: UROLOGY
Payer: MEDICARE

## 2018-01-15 DIAGNOSIS — N20.0 NEPHROLITHIASIS: ICD-10-CM

## 2018-01-15 DIAGNOSIS — N28.89 RENAL MASS, RIGHT: ICD-10-CM

## 2018-01-15 DIAGNOSIS — N20.0 CALCULUS OF KIDNEY: ICD-10-CM

## 2018-01-15 PROCEDURE — 74018 RADEX ABDOMEN 1 VIEW: CPT | Mod: TC

## 2018-01-15 PROCEDURE — 74018 RADEX ABDOMEN 1 VIEW: CPT | Mod: 26,,, | Performed by: RADIOLOGY

## 2018-01-15 PROCEDURE — 76770 US EXAM ABDO BACK WALL COMP: CPT | Mod: TC

## 2018-01-15 PROCEDURE — 76770 US EXAM ABDO BACK WALL COMP: CPT | Mod: 26,,, | Performed by: RADIOLOGY

## 2018-01-17 ENCOUNTER — TELEPHONE (OUTPATIENT)
Dept: UROLOGY | Facility: CLINIC | Age: 83
End: 2018-01-17

## 2018-01-17 NOTE — TELEPHONE ENCOUNTER
----- Message from Malka Blair MD sent at 1/17/2018  1:59 PM CST -----  Mass stable on ultrasound. I will see her for the appt

## 2018-01-23 ENCOUNTER — OFFICE VISIT (OUTPATIENT)
Dept: UROLOGY | Facility: CLINIC | Age: 83
End: 2018-01-23
Payer: MEDICARE

## 2018-01-23 VITALS
HEIGHT: 62 IN | BODY MASS INDEX: 34.8 KG/M2 | SYSTOLIC BLOOD PRESSURE: 130 MMHG | WEIGHT: 189.13 LBS | DIASTOLIC BLOOD PRESSURE: 66 MMHG | HEART RATE: 73 BPM

## 2018-01-23 DIAGNOSIS — E21.3 HYPERPARATHYROIDISM: ICD-10-CM

## 2018-01-23 DIAGNOSIS — N20.0 CALCULUS OF KIDNEY: ICD-10-CM

## 2018-01-23 DIAGNOSIS — N28.89 RENAL MASS, RIGHT: ICD-10-CM

## 2018-01-23 DIAGNOSIS — N39.0 RECURRENT UTI: ICD-10-CM

## 2018-01-23 DIAGNOSIS — E11.29 TYPE 2 DIABETES MELLITUS WITH OTHER KIDNEY COMPLICATION, UNSPECIFIED LONG TERM INSULIN USE STATUS: Primary | ICD-10-CM

## 2018-01-23 PROCEDURE — 99999 PR PBB SHADOW E&M-EST. PATIENT-LVL III: CPT | Mod: PBBFAC,,, | Performed by: UROLOGY

## 2018-01-23 PROCEDURE — 99214 OFFICE O/P EST MOD 30 MIN: CPT | Mod: 25,S$GLB,, | Performed by: UROLOGY

## 2018-01-23 PROCEDURE — 81001 URINALYSIS AUTO W/SCOPE: CPT | Mod: S$GLB,,, | Performed by: UROLOGY

## 2018-01-23 NOTE — PATIENT INSTRUCTIONS
Avoid salt, beets, spinach, iced tea.   Drink lots of water.   Do the 24 hour urine.     Wake up in the morning and throw away the first urine, but document the time.   Collect urine for 24 hours until the time you documented.   Return everything to the lab at [facility].   Do not mix the other box in the container you collect the urine.

## 2018-01-23 NOTE — PROGRESS NOTES
Subjective:       Patient ID: Sakshi Marcial is a 85 y.o. female.    Chief Complaint: Follow-up    Sakshi Marcial is a 84 y.o. Female here for follow up.   She is s/p right ureteroscopy 1/16/17 and stent removal 2/17.  (She presented with infected stone and had stent placement prior).  She had UTI 7/17 also.     Culture was e.coli, pan sensitive. Had ARF.   CKD, GFR 39. She sees Dr. Pisano    Also, has right renal mass, 1.3cm mid pole.      On Vit D.   No previous history of stones. She is drinking more water.  Was drinking a lot of iced tea and soft drinks.   Coffee on occasion.  Good bit of salt.   No MVI.  No tums or rolaids.       Dr. Pisano gave her antibiotics. No culture. Urine micro showed many bacteria. 7 WBC.  Nitrite negative. 3+ glucose. It was a voided specimen.   Her complaint was yellow urine. No dysuria. No increased urinary frequency, urgency.   No definitive UTI.             History reviewed. No pertinent surgical history.    Past Medical History:   Diagnosis Date    Hyperlipidemia     Hypertension     Type 2 diabetes mellitus with chronic kidney disease        Social History     Social History    Marital status:      Spouse name: N/A    Number of children: N/A    Years of education: N/A     Occupational History    Not on file.     Social History Main Topics    Smoking status: Never Smoker    Smokeless tobacco: Not on file    Alcohol use No    Drug use: No    Sexual activity: Not on file     Other Topics Concern    Not on file     Social History Narrative    No narrative on file       History reviewed. No pertinent family history.    Current Outpatient Prescriptions   Medication Sig Dispense Refill    amlodipine (NORVASC) 5 MG tablet Take 5 mg by mouth once daily.      aspirin 81 MG Chew Take 81 mg by mouth once daily.      atorvastatin (LIPITOR) 40 MG tablet Take 40 mg by mouth once daily.      cinacalcet (SENSIPAR) 30 MG Tab Take 1 tablet (30 mg total) by mouth  daily with breakfast. 30 tablet 11    furosemide (LASIX) 20 MG tablet Take 20 mg by mouth 2 (two) times daily.      losartan (COZAAR) 100 MG tablet Take 1 tablet (100 mg total) by mouth once daily. 90 tablet 3    polyethylene glycol (GLYCOLAX) 17 gram PwPk Take 17 g by mouth once daily. 30 packet 0    acetaminophen (TYLENOL) 500 MG tablet Take 500 mg by mouth every 6 (six) hours as needed for Pain.       No current facility-administered medications for this visit.        Review of patient's allergies indicates:  No Known Allergies    Review of Systems   Constitutional: Negative for chills, fever and unexpected weight change.   HENT: Negative for nosebleeds.    Eyes: Negative for visual disturbance.   Respiratory: Negative for chest tightness.    Cardiovascular: Negative for chest pain.   Gastrointestinal: Negative for diarrhea.   Genitourinary: Positive for frequency and urgency. Negative for dysuria, hematuria and nocturia.   Musculoskeletal: Negative for myalgias.   Skin: Negative for rash.   Neurological: Negative for seizures.   Hematological: Does not bruise/bleed easily.   Psychiatric/Behavioral: Negative for behavioral problems.       Objective:      Physical Exam   Vitals reviewed.  Constitutional: She is oriented to person, place, and time. She appears well-developed and well-nourished.   HENT:   Head: Normocephalic and atraumatic.   Eyes: No scleral icterus.   Cardiovascular: Normal rate and regular rhythm.    Pulmonary/Chest: Effort normal. No respiratory distress.   Abdominal: She exhibits no mass.   Musculoskeletal: She exhibits no tenderness.   Lymphadenopathy:     She has no cervical adenopathy.   Neurological: She is alert and oriented to person, place, and time.   Skin: Skin is warm and dry. No rash noted.     Psychiatric: She has a normal mood and affect.     urine dip clear  Assessment:       1. Type 2 diabetes mellitus with other kidney complication, unspecified long term insulin use status     2. Renal mass, right    3. Calculus of kidney    4. Hyperparathyroidism    5. Recurrent UTI        Plan:   CT RSS in 6 months. Had a new stone 8mm from 12/16 to 8/17. Not seen on ultrasound, but imaging poor.  Renal mass stable.  Natural history of small renal masses discussed.   Do the 24 hour urine. New container given.  Follow up with Dr. Sanchez.   General risk factors for kidney stones and the conservative measures to prevent kidney stones in the future were discussed with the patient in detail.  The patient was encouraged to drink 2-3 liters of water a day, have lots of fruits and veggies, limit oxalate and salt in the diet, limit iced tea and dilcia, to avoid Tums and Rolaids, to avoid unnecessary supplements and NSAIDS.       All films personally reviewed.     I spent 25 minutes with the patient of which more than half was spent in direct consultation with the patient in regards to our treatment and plan.

## 2018-02-01 ENCOUNTER — LAB VISIT (OUTPATIENT)
Dept: LAB | Facility: HOSPITAL | Age: 83
End: 2018-02-01
Attending: UROLOGY
Payer: MEDICARE

## 2018-02-01 DIAGNOSIS — N20.0 CALCULUS OF KIDNEY: ICD-10-CM

## 2018-02-01 PROCEDURE — 83986 ASSAY PH BODY FLUID NOS: CPT

## 2018-02-01 PROCEDURE — 84300 ASSAY OF URINE SODIUM: CPT

## 2018-02-07 LAB — STONE ANALYSIS-IMP: NORMAL

## 2018-02-09 ENCOUNTER — TELEPHONE (OUTPATIENT)
Dept: UROLOGY | Facility: CLINIC | Age: 83
End: 2018-02-09

## 2018-02-09 NOTE — TELEPHONE ENCOUNTER
----- Message from Malka Blair MD sent at 2/9/2018 12:07 PM CST -----  24 hour urine was normal. Good job!

## 2018-05-10 ENCOUNTER — HOSPITAL ENCOUNTER (OUTPATIENT)
Dept: RADIOLOGY | Facility: HOSPITAL | Age: 83
Discharge: HOME | End: 2018-05-10
Attending: UROLOGY
Payer: MEDICARE

## 2018-05-10 DIAGNOSIS — N28.89 RENAL MASS, RIGHT: ICD-10-CM

## 2018-05-10 DIAGNOSIS — N20.0 CALCULUS OF KIDNEY: ICD-10-CM

## 2018-05-10 PROCEDURE — 74176 CT ABD & PELVIS W/O CONTRAST: CPT | Mod: TC

## 2018-05-10 PROCEDURE — 74176 CT ABD & PELVIS W/O CONTRAST: CPT | Mod: 26,,, | Performed by: RADIOLOGY

## 2018-05-31 ENCOUNTER — TELEPHONE (OUTPATIENT)
Dept: UROLOGY | Facility: CLINIC | Age: 83
End: 2018-05-31

## 2018-05-31 DIAGNOSIS — N28.89 RENAL MASS: Primary | ICD-10-CM

## 2018-05-31 NOTE — TELEPHONE ENCOUNTER
----- Message from Gricelda Sánchez LPN sent at 5/23/2018  2:46 PM CDT -----  Contact: Dr. Gabriela Joseph    [phone]  Dr. Joseph would like to speak to you.  ----- Message -----  From: Julia Ackerman MA  Sent: 5/23/2018   2:44 PM  To: [facility]nie [facility] Staff    States she needs to speak to you per Alycia her nurse she is her PCP.

## 2018-06-06 ENCOUNTER — TELEPHONE (OUTPATIENT)
Dept: UROLOGY | Facility: CLINIC | Age: 83
End: 2018-06-06

## 2018-06-06 NOTE — TELEPHONE ENCOUNTER
Spoke to patient. I did advise to her that I sent Dr. Blair the message and I am waiting for her to answer . I will call patient back as soon as I know the answer. Patient verbalized understanding.

## 2018-06-06 NOTE — TELEPHONE ENCOUNTER
----- Message from Gabriela Herrera MA sent at 6/6/2018  3:20 PM CDT -----  Contact: Home: [phone]   Needs Advice    Reason for call:  Pt is sure Dr Blair called her and told  Her that she does not need to see her on the 12 th. But Theres no documentation.  Can you verify that? Pt does not want to make a wasted trip.    Communication Preference: Home: [phone]     Additional Information: the appt has not been cx'ed yet

## 2018-06-08 ENCOUNTER — TELEPHONE (OUTPATIENT)
Dept: UROLOGY | Facility: CLINIC | Age: 83
End: 2018-06-08

## 2018-06-08 NOTE — TELEPHONE ENCOUNTER
----- Message from Vivian Mac RN sent at 6/7/2018  6:13 PM CDT -----  Contact: Pt:[phone]      ----- Message -----  From: Debora Hanks  Sent: 6/7/2018   3:48 PM  To: Johnnie [facility] Staff    Patient Requesting Call from [facility]    Pt requesting a call:Pt would like to speak with the nurse to find out if her appointment is still needed.  Communication Preference:telephone  Additional Information:

## 2018-06-08 NOTE — TELEPHONE ENCOUNTER
Spoke with patient . She was advised she did not need the follow up on the 12th. Appointment was canceled . Pt verbalized understanding.

## 2018-11-27 DIAGNOSIS — I71.40 ABDOMINAL AORTIC ANEURYSM: Primary | ICD-10-CM

## 2018-12-03 ENCOUNTER — TELEPHONE (OUTPATIENT)
Dept: UROLOGY | Facility: CLINIC | Age: 83
End: 2018-12-03

## 2018-12-03 ENCOUNTER — LAB VISIT (OUTPATIENT)
Dept: LAB | Facility: HOSPITAL | Age: 83
End: 2018-12-03
Attending: UROLOGY
Payer: MEDICARE

## 2018-12-03 ENCOUNTER — HOSPITAL ENCOUNTER (OUTPATIENT)
Dept: RADIOLOGY | Facility: HOSPITAL | Age: 83
Discharge: HOME | End: 2018-12-03
Attending: UROLOGY
Payer: MEDICARE

## 2018-12-03 DIAGNOSIS — N28.89 RENAL MASS: ICD-10-CM

## 2018-12-03 DIAGNOSIS — E21.3 HYPERPARATHYROIDISM: ICD-10-CM

## 2018-12-03 DIAGNOSIS — E11.29 TYPE 2 DIABETES MELLITUS WITH OTHER KIDNEY COMPLICATION, UNSPECIFIED WHETHER LONG TERM INSULIN USE: ICD-10-CM

## 2018-12-03 DIAGNOSIS — N20.0 KIDNEY STONES: Primary | ICD-10-CM

## 2018-12-03 LAB
ALBUMIN SERPL BCP-MCNC: 3.7 G/DL
ALP SERPL-CCNC: 63 U/L
ALT SERPL W/O P-5'-P-CCNC: 14 U/L
ANION GAP SERPL CALC-SCNC: 7 MMOL/L
AST SERPL-CCNC: 20 U/L
BILIRUB SERPL-MCNC: 0.4 MG/DL
BUN SERPL-MCNC: 27 MG/DL
CALCIUM SERPL-MCNC: 11.2 MG/DL
CHLORIDE SERPL-SCNC: 109 MMOL/L
CO2 SERPL-SCNC: 25 MMOL/L
CREAT SERPL-MCNC: 1.4 MG/DL
EST. GFR  (AFRICAN AMERICAN): 39 ML/MIN/1.73 M^2
EST. GFR  (NON AFRICAN AMERICAN): 34 ML/MIN/1.73 M^2
GLUCOSE SERPL-MCNC: 171 MG/DL
POTASSIUM SERPL-SCNC: 4.6 MMOL/L
PROT SERPL-MCNC: 7.4 G/DL
SODIUM SERPL-SCNC: 141 MMOL/L

## 2018-12-03 PROCEDURE — 71046 X-RAY EXAM CHEST 2 VIEWS: CPT | Mod: TC

## 2018-12-03 PROCEDURE — 76770 US EXAM ABDO BACK WALL COMP: CPT | Mod: 26,,, | Performed by: RADIOLOGY

## 2018-12-03 PROCEDURE — 71046 X-RAY EXAM CHEST 2 VIEWS: CPT | Mod: 26,,, | Performed by: RADIOLOGY

## 2018-12-03 PROCEDURE — 80053 COMPREHEN METABOLIC PANEL: CPT

## 2018-12-03 PROCEDURE — 76770 US EXAM ABDO BACK WALL COMP: CPT | Mod: TC

## 2018-12-03 PROCEDURE — 36415 COLL VENOUS BLD VENIPUNCTURE: CPT

## 2018-12-11 ENCOUNTER — OFFICE VISIT (OUTPATIENT)
Dept: UROLOGY | Facility: CLINIC | Age: 83
End: 2018-12-11
Payer: MEDICARE

## 2018-12-11 VITALS
HEIGHT: 62 IN | BODY MASS INDEX: 34.08 KG/M2 | SYSTOLIC BLOOD PRESSURE: 151 MMHG | WEIGHT: 185.19 LBS | HEART RATE: 82 BPM | DIASTOLIC BLOOD PRESSURE: 71 MMHG

## 2018-12-11 DIAGNOSIS — N28.89 RENAL MASS, RIGHT: ICD-10-CM

## 2018-12-11 DIAGNOSIS — E11.29 TYPE 2 DIABETES MELLITUS WITH OTHER KIDNEY COMPLICATION, UNSPECIFIED WHETHER LONG TERM INSULIN USE: Primary | ICD-10-CM

## 2018-12-11 DIAGNOSIS — I10 ESSENTIAL HYPERTENSION: ICD-10-CM

## 2018-12-11 PROCEDURE — 99999 PR PBB SHADOW E&M-EST. PATIENT-LVL III: CPT | Mod: PBBFAC,,, | Performed by: UROLOGY

## 2018-12-11 PROCEDURE — 99214 OFFICE O/P EST MOD 30 MIN: CPT | Mod: S$GLB,,, | Performed by: UROLOGY

## 2018-12-11 PROCEDURE — 1101F PT FALLS ASSESS-DOCD LE1/YR: CPT | Mod: CPTII,S$GLB,, | Performed by: UROLOGY

## 2018-12-11 RX ORDER — AMOXICILLIN 500 MG
CAPSULE ORAL DAILY
COMMUNITY
End: 2019-07-05

## 2018-12-11 NOTE — PROGRESS NOTES
"Subjective:       Patient ID: Sakshi Marcial is a 86 y.o. female.    Chief Complaint: Follow-up (had labs and imaging done on last week)    Sakshi Marcial is a 86 y.o. Female here for follow up of renal mass.   She has an increasing right renal mass.   She has hypercalcemia. Last calcium was 11.2. She was followed by Dr. Sanchez.   No gross hematuria. No LUTS. No incontinence.     Last LFT 12/3/18  GFR 39 12/3/18  CXR 12/3/18    Renal ultrasound 12/3/18    Enlarging exophytic solid mass within the right midpole measuring up to 2.3 cm in greatest dimension, concerning for renal cell carcinoma.  If surgery is not an option, consider IR consult for further evaluation of renal mass.    Bilateral renal cysts.    Findings compatible with chronic medical renal disease.    She is s/p right ureteroscopy 1/16/17 and stent removal 2/17.  (She presented with infected stone and had stent placement prior).  She had UTI 7/17 also.     Off Vit D now.  No previous history of stones. She is drinking more water.  Was drinking a lot of iced tea and soft drinks.   Coffee on occasion.  Good bit of salt.   No MVI.  No tums or rolaids.     No bone pain. Some arthritis in her fingers.   No constipation b/c she takes a stool softner.     She goes to the "center" at [facility]. She does arm lifts, leg lifts.   She walks a good distance. She does get a little short winded with longer walks.   Like today she parked past the cancer center and walked all the way over.               Past Surgical History:   Procedure Laterality Date    CYSTOSCOPY WITH STENT PLACEMENT Right 12/16/2016    Per[facility]    EXTRACTION-STONE-URETEROSCOPY Right 1/16/2017    Per[facility] by [facility]alka Blair [facility] at [facility]    LITHOTRIPSY-LASER Right 1/16/2017    [facility]    REMOVAL-STENT Right 1/16/2017    Per[facility]    " REPLACEMENT-STENT Right 1/16/2017    Performed by Malka Blair MD at [facility] OR [address] [place] [address]       Past Medical History:   Diagnosis Date    Hyperlipidemia     Hypertension     Type 2 diabetes mellitus with chronic kidney disease        Social History     Socioeconomic History    Marital status:      Spouse name: Not on file    Number of children: Not on file    Years of education: Not on file    Highest education level: Not on file   Social Needs    Financial resource strain: Not on file    Food insecurity - worry: Not on file    Food insecurity - inability: Not on file    Transportation needs - medical: Not on file    Transportation needs - non-medical: Not on file   Occupational History    Not on file   Tobacco Use    Smoking status: Never Smoker   Substance and Sexual Activity    Alcohol use: No    Drug use: No    Sexual activity: Not on file   Other Topics Concern    Not on file   Social History Narrative    Not on file       History reviewed. No pertinent family history.    Current Outpatient Medications   Medication Sig Dispense Refill    acetaminophen (TYLENOL) 500 MG tablet Take 500 mg by mouth every 6 (six) hours as needed for Pain.      amlodipine (NORVASC) 5 MG tablet Take 5 mg by mouth once daily.      aspirin 81 MG Chew Take 81 mg by mouth once daily.      atorvastatin (LIPITOR) 40 MG tablet Take 40 mg by mouth once daily.      furosemide (LASIX) 20 MG tablet Take 20 mg by mouth 2 (two) times daily.      cinacalcet (SENSIPAR) 30 MG Tab Take 1 tablet (30 mg total) by mouth daily with breakfast. 30 tablet 11    losartan (COZAAR) 100 MG tablet Take 1 tablet (100 mg total) by mouth once daily. 90 tablet 3    polyethylene glycol (GLYCOLAX) 17 gram PwPk Take 17 g by mouth once daily. 30 packet 0     No current facility-administered medications for this visit.        Review of patient's allergies indicates:  No Known Allergies    Review of Systems   Constitutional: Negative  for chills, fever and unexpected weight change.   HENT: Negative for nosebleeds.    Eyes: Negative for visual disturbance.   Respiratory: Negative for chest tightness.    Cardiovascular: Negative for chest pain.   Gastrointestinal: Negative for constipation and diarrhea.   Genitourinary: Negative for dysuria, frequency, hematuria, nocturia and urgency.   Musculoskeletal: Positive for arthralgias. Negative for myalgias.   Skin: Negative for rash.   Neurological: Negative for seizures and headaches.   Psychiatric/Behavioral: Negative for behavioral problems.       Objective:      Physical Exam   Vitals reviewed.  Constitutional: She is oriented to person, place, and time. She appears well-developed and well-nourished.   HENT:   Head: Normocephalic and atraumatic.   Eyes: No scleral icterus.   Cardiovascular: Normal rate and regular rhythm.    Pulmonary/Chest: Effort normal. No respiratory distress.   Abdominal: She exhibits no mass.   Musculoskeletal: She exhibits no tenderness.   Lymphadenopathy:     She has no cervical adenopathy.   Neurological: She is alert and oriented to person, place, and time.   Skin: Skin is warm and dry. No rash noted.     Psychiatric: She has a normal mood and affect.     urine dip clear  Assessment:       1. Type 2 diabetes mellitus with other kidney complication, unspecified whether long term insulin use    2. Essential hypertension    3. Renal mass, right        Plan:   Renal ultrasound in 6 months. Follow up with me at that time.   Appointment with Dr. Bolden to discuss enlarging renal mass.   Also has hypercalcemia for which Dr. Sanchez was following. Will order new labs today.     General risk factors for kidney stones and the conservative measures to prevent kidney stones in the future were discussed with the patient in detail.  The patient was encouraged to drink 2-3 liters of water a day, have lots of fruits and veggies, limit oxalate and salt in the diet, limit iced tea and  dilcia, to avoid Tums and Rolaids, to avoid unnecessary supplements and NSAIDS.     All films personally reviewed.     I spent 25 minutes with the patient of which more than half was spent in direct consultation with the patient in regards to our treatment and plan.

## 2018-12-13 ENCOUNTER — LAB VISIT (OUTPATIENT)
Dept: LAB | Facility: HOSPITAL | Age: 83
End: 2018-12-13
Attending: UROLOGY
Payer: MEDICARE

## 2018-12-13 DIAGNOSIS — N20.0 KIDNEY STONES: ICD-10-CM

## 2018-12-13 DIAGNOSIS — E11.29 TYPE 2 DIABETES MELLITUS WITH OTHER KIDNEY COMPLICATION, UNSPECIFIED WHETHER LONG TERM INSULIN USE: ICD-10-CM

## 2018-12-13 DIAGNOSIS — E21.3 HYPERPARATHYROIDISM: ICD-10-CM

## 2018-12-13 DIAGNOSIS — N28.89 RENAL MASS: ICD-10-CM

## 2018-12-13 LAB
25(OH)D3+25(OH)D2 SERPL-MCNC: 64 NG/ML
ALBUMIN SERPL BCP-MCNC: 3.7 G/DL
ALP SERPL-CCNC: 61 U/L
ALT SERPL W/O P-5'-P-CCNC: 18 U/L
AST SERPL-CCNC: 18 U/L
BILIRUB DIRECT SERPL-MCNC: 0.2 MG/DL
BILIRUB SERPL-MCNC: 0.4 MG/DL
ERYTHROCYTE [DISTWIDTH] IN BLOOD BY AUTOMATED COUNT: 14.9 %
ESTIMATED AVG GLUCOSE: 140 MG/DL
HBA1C MFR BLD HPLC: 6.5 %
HCT VFR BLD AUTO: 37.3 %
HGB BLD-MCNC: 11.8 G/DL
MCH RBC QN AUTO: 30.8 PG
MCHC RBC AUTO-ENTMCNC: 31.6 G/DL
MCV RBC AUTO: 97 FL
PLATELET # BLD AUTO: 159 K/UL
PMV BLD AUTO: 11.6 FL
PROT SERPL-MCNC: 7.3 G/DL
PTH-INTACT SERPL-MCNC: 145 PG/ML
RBC # BLD AUTO: 3.83 M/UL
WBC # BLD AUTO: 3.77 K/UL

## 2018-12-13 PROCEDURE — 83970 ASSAY OF PARATHORMONE: CPT

## 2018-12-13 PROCEDURE — 83036 HEMOGLOBIN GLYCOSYLATED A1C: CPT

## 2018-12-13 PROCEDURE — 85027 COMPLETE CBC AUTOMATED: CPT

## 2018-12-13 PROCEDURE — 36415 COLL VENOUS BLD VENIPUNCTURE: CPT

## 2018-12-13 PROCEDURE — 82306 VITAMIN D 25 HYDROXY: CPT

## 2018-12-13 PROCEDURE — 80076 HEPATIC FUNCTION PANEL: CPT

## 2018-12-20 ENCOUNTER — OFFICE VISIT (OUTPATIENT)
Dept: UROLOGY | Facility: CLINIC | Age: 83
End: 2018-12-20
Payer: MEDICARE

## 2018-12-20 VITALS
HEIGHT: 62 IN | HEART RATE: 76 BPM | SYSTOLIC BLOOD PRESSURE: 144 MMHG | BODY MASS INDEX: 33.76 KG/M2 | DIASTOLIC BLOOD PRESSURE: 65 MMHG | WEIGHT: 183.44 LBS

## 2018-12-20 DIAGNOSIS — N28.89 RENAL MASS, RIGHT: ICD-10-CM

## 2018-12-20 DIAGNOSIS — I10 ESSENTIAL HYPERTENSION: ICD-10-CM

## 2018-12-20 DIAGNOSIS — N20.0 CALCULUS OF KIDNEY: ICD-10-CM

## 2018-12-20 DIAGNOSIS — E11.29 TYPE 2 DIABETES MELLITUS WITH OTHER KIDNEY COMPLICATION, UNSPECIFIED WHETHER LONG TERM INSULIN USE: Primary | ICD-10-CM

## 2018-12-20 PROCEDURE — 99213 OFFICE O/P EST LOW 20 MIN: CPT | Mod: S$GLB,,, | Performed by: UROLOGY

## 2018-12-20 PROCEDURE — 99999 PR PBB SHADOW E&M-EST. PATIENT-LVL III: CPT | Mod: PBBFAC,,, | Performed by: UROLOGY

## 2018-12-20 PROCEDURE — 1101F PT FALLS ASSESS-DOCD LE1/YR: CPT | Mod: CPTII,S$GLB,, | Performed by: UROLOGY

## 2018-12-21 ENCOUNTER — TELEPHONE (OUTPATIENT)
Dept: SURGERY | Facility: CLINIC | Age: 83
End: 2018-12-21

## 2018-12-21 NOTE — PROGRESS NOTES
Subjective:       Patient ID: Sakshi Marcial is a 86 y.o. female.    Chief Complaint:  Discuss renal mass      History of Present Illness  HPI  Patient is a 86 y.o. female who is established to our clinic and was initially referred by their urologist, Dr. Blair for evaluation of a renal mass.  The patient is currently asymptomatic. No gross hematuria. No unexpected weight loss.  No other complaints.  Found to have growth of renal mass on routine surveillance imaging.        Review of Systems  Review of Systems  All other systems reviewed and negative except pertinent positives noted in HPI.       Objective:     Physical Exam   Constitutional: She is oriented to person, place, and time. She appears well-developed and well-nourished. No distress.   HENT:   Head: Normocephalic and atraumatic.   Eyes: No scleral icterus.   Neck: No tracheal deviation present.   Pulmonary/Chest: Effort normal. No respiratory distress.   Neurological: She is alert and oriented to person, place, and time.   Psychiatric: She has a normal mood and affect. Her behavior is normal. Judgment and thought content normal.       Lab Review  Lab Results   Component Value Date    COLORU Yellow 05/29/2017    SPECGRAV 1.015 01/23/2018    PHUR 5 01/23/2018    WBCUR n 01/23/2018    NITRITE n 01/23/2018    PROTEINUR n 01/23/2018    GLUCOSEUR n 01/23/2018    KETONESU n 01/23/2018    UROBILINOGEN n 01/23/2018    BILIRUBINUR n 01/23/2018    RBCUR n 01/23/2018         Assessment:        1. Type 2 diabetes mellitus with other kidney complication, unspecified whether long term insulin use    2. Essential hypertension    3. Renal mass, right    4. Calculus of kidney            Plan:     Type 2 diabetes mellitus with other kidney complication, unspecified whether long term insulin use    Essential hypertension    Renal mass, right    Calculus of kidney    - Long talk about renal mass and it's management.  Reviewed images.Discussed options including active  surveillance, biopsy, minimally invasive techniques including HFRA, cryo. Discussed open and laparascopic surgical approaches. Discussed partial and radical nephrectomy. Discussed surgery preparation, surgery, recuperation, recovery, exercise restrictions. Discussed risks, benefits, and complications. Answered questions and addressed their concerns.  -given her age and still a small renal mass, she will continue active surveillance.  If continued growth, would opt for likely biopsy/ablation.  She is not interested in surgery, which I feel is very reasonable given her age.   -continue to f/u with Dr. Blair for evaluation/management of kidney stone and surveillance of small renal mass.

## 2018-12-21 NOTE — TELEPHONE ENCOUNTER
Spoke with pt. She's scheduled to see Dr. Prieto for elevated PTH and calcium 11, and adrenal mass.

## 2019-01-17 ENCOUNTER — INITIAL CONSULT (OUTPATIENT)
Dept: SURGERY | Facility: CLINIC | Age: 84
End: 2019-01-17
Payer: MEDICARE

## 2019-01-17 VITALS
WEIGHT: 182.75 LBS | DIASTOLIC BLOOD PRESSURE: 63 MMHG | HEIGHT: 62 IN | BODY MASS INDEX: 33.63 KG/M2 | HEART RATE: 72 BPM | TEMPERATURE: 98 F | SYSTOLIC BLOOD PRESSURE: 141 MMHG | RESPIRATION RATE: 18 BRPM

## 2019-01-17 DIAGNOSIS — E21.3 HYPERPARATHYROIDISM: Primary | ICD-10-CM

## 2019-01-17 PROCEDURE — 99999 PR PBB SHADOW E&M-EST. PATIENT-LVL IV: CPT | Mod: PBBFAC,,, | Performed by: SURGERY

## 2019-01-17 PROCEDURE — 1101F PR PT FALLS ASSESS DOC 0-1 FALLS W/OUT INJ PAST YR: ICD-10-PCS | Mod: CPTII,S$GLB,, | Performed by: SURGERY

## 2019-01-17 PROCEDURE — 99999 PR PBB SHADOW E&M-EST. PATIENT-LVL IV: ICD-10-PCS | Mod: PBBFAC,,, | Performed by: SURGERY

## 2019-01-17 PROCEDURE — 99204 OFFICE O/P NEW MOD 45 MIN: CPT | Mod: S$GLB,,, | Performed by: SURGERY

## 2019-01-17 PROCEDURE — 1101F PT FALLS ASSESS-DOCD LE1/YR: CPT | Mod: CPTII,S$GLB,, | Performed by: SURGERY

## 2019-01-17 PROCEDURE — 99204 PR OFFICE/OUTPT VISIT, NEW, LEVL IV, 45-59 MIN: ICD-10-PCS | Mod: S$GLB,,, | Performed by: SURGERY

## 2019-01-17 NOTE — LETTER
Endocrine/General Surgery  [address] [id number]  Phone: [phone]  Fax: [phone] January 28, 2019           Malka Blair MD  [address] [id number]    Patient: Sakshi Marcial   YOB: 1932   Date of Visit: 1/17/2019     Dear Dr. Blair:    I saw Ms. Marcial in clinic. Attached you will find a copy of my note.    As you know, she is a 86 y.o. female who presented with hypercalcemia in the setting of a prior kidney stone. I was able to discuss the typical workup and treatment for hyperparathyroidism. The current plan includes completion of the workup and determination of possible surgery.    If you have any questions or concerns, please don't hesitate to contact my office. Again, thank you kindly for this referral.    With many thanks,        Felisha Mendiola MD      CC  Gabriela Terry, NP  [address] [place] [id number]  VIA Facsimile: [phone]

## 2019-01-17 NOTE — Clinical Note
January 28, 2019      Malka Blair MD  [address]  [place] [address]           [facility] - Endo [facility]  [address] [facility]  [facility] [address]  Phone: [phone]          Patient: Sakshi Marcial   MR Number: [id number]   YOB: 1932   Date of Visit: 1/17/2019       Dear Dr. Malka Blair:    Thank you for referring Sakshi Marcial to me for evaluation. Attached you will find relevant portions of my assessment and plan of care.    If you have questions, please do not hesitate to call me. I look forward to following Sakshi Marcial along with you.    Sincerely,    Felisha Mendiola MD    Enclosure  CC:  No Recipients    If you would like to receive this communication electronically, please contact [email or web address] or [phone] to request more information on [facility] access.    For providers and/or their staff who would like to refer a patient to [facility], please contact us through our one-stop-shop provider referral line, [facility], at [phone].    If you feel you have received this communication in error or would no longer like to receive these types of communications, please e-mail [email or web address]

## 2019-01-17 NOTE — PROGRESS NOTES
I have reviewed the Resident's history and physical, assessment and plan. I agree with the findings.  Any changes were made directly in the note below.     Felisha Mendiola MD  Endocrine Surgery    History & Physical    SUBJECTIVE:     History of Present Illness:  Patient is a 86 y.o. female with a past medical history of DMII (last A1c 6.5) complicated by some mild renal dysfunction (GFR 39) and peripheral neuropathy, HTN on medications, OA, and nephrolithiasis (once in 2018), who presents for evaluation of hypercalcemia in the setting of hyperparathyroidism. Of note, other than the one episode of nephrolithiasis the patient has not had any symptoms of hypercalcemia. She was previously followed by nephrology in 2017 who had prescribed sensipar, however it is unclear if the patient ever took this medicine, and she was subsequently lost to follow up reportedly due to the physician having moved away. Currently on her active medications list sensipar is not listed. She is being followed by urology for a renal mass, they have elected to follow it given it's size and the patient's age. She is an otherwise healthy 85 yo female, who is independent and active.     No history of head and neck radiation.      Chief Complaint   Patient presents with    Consult       Review of patient's allergies indicates:  No Known Allergies    Current Outpatient Medications   Medication Sig Dispense Refill    acetaminophen (TYLENOL) 500 MG tablet Take 500 mg by mouth every 6 (six) hours as needed for Pain.      amlodipine (NORVASC) 5 MG tablet Take 5 mg by mouth once daily.      aspirin 81 MG Chew Take 81 mg by mouth once daily.      atorvastatin (LIPITOR) 40 MG tablet Take 40 mg by mouth once daily.      fish oil-omega-3 fatty acids 300-1,000 mg capsule Take by mouth once daily.      furosemide (LASIX) 20 MG tablet Take 20 mg by mouth 2 (two) times daily.      polyethylene glycol (GLYCOLAX) 17 gram PwPk Take 17 g by mouth once  "daily. 30 packet 0    cinacalcet (SENSIPAR) 30 MG Tab Take 1 tablet (30 mg total) by mouth daily with breakfast. 30 tablet 11    losartan (COZAAR) 100 MG tablet Take 1 tablet (100 mg total) by mouth once daily. 90 tablet 3    vitamin E acetate (VITAMIN E ORAL) Take by mouth.       No current facility-administered medications for this visit.        Past Medical History:   Diagnosis Date    Hyperlipidemia     Hypertension     Type 2 diabetes mellitus with chronic kidney disease      Past Surgical History:   Procedure Laterality Date    CYSTOSCOPY WITH STENT PLACEMENT Right 12/16/2016    [facility]    EXTRACTION-STONE-URETEROSCOPY Right 1/16/2017    Performed by [facility]a Blair MD at [facility] OR [address] [place] [address]    LITHOTRIPSY-LASER Right 1/16/2017    [facility]    REMOVAL-STENT Right 1/16/2017    [facility]    REPLACEMENT-STENT Right 1/16/2017    Performed by [facility] Blair MD at [facility] OR [address] [place] [address]     No family history on file.  Social History     Tobacco Use    Smoking status: Never Smoker   Substance Use Topics    Alcohol use: No    Drug use: No        Review of Systems:  Review of Systems   Constitutional: Negative for chills and fever.   HENT: Negative for congestion, trouble swallowing and voice change.    Respiratory: Negative.    Gastrointestinal: Negative for abdominal distention, abdominal pain, constipation, diarrhea and nausea.   Endocrine: Negative for cold intolerance, heat intolerance and polyuria.   Genitourinary: Negative.    Musculoskeletal: Positive for arthralgias.   Psychiatric/Behavioral: Negative for agitation and behavioral problems.       OBJECTIVE:     Vital Signs (Most Recent)  Temp: 97.8 °F (36.6 °C) (01/17/19 0947)  Pulse: 72 (01/17/19 0947)  Resp: 18 (01/17/19 0947)  BP: (!) 141/63 (01/17/19 0947)  5' 2" (1.575 m)  82.9 kg (182 lb 12.2 oz) "     Physical Exam:  Physical Exam   Constitutional: She is oriented to person, place, and time. She appears well-developed and well-nourished. No distress.   HENT:   Head: Normocephalic and atraumatic.   Eyes: Conjunctivae and EOM are normal.   Neck: No thyromegaly present.   Cardiovascular: Normal rate.   Pulmonary/Chest: Effort normal. No respiratory distress.   Abdominal: Soft. She exhibits no distension.   Lymphadenopathy:     She has no cervical adenopathy.   Neurological: She is alert and oriented to person, place, and time. No cranial nerve deficit.   Psychiatric: She has a normal mood and affect. Her behavior is normal.       Laboratory/Diagnostic Results:       Ref. Range 8/16/2017 10:25 12/13/2018 09:11   PTH Latest Ref Range: 9.0 - 77.0 pg/mL 144.0 (H) 145.0 (H)      Ref. Range 12/13/2018 09:11   Vit D, 25-Hydroxy Latest Ref Range: 30 - 96 ng/mL 64      Ref. Range 12/3/2018 11:40   Sodium Latest Ref Range: 136 - 145 mmol/L 141   Potassium Latest Ref Range: 3.5 - 5.1 mmol/L 4.6   Chloride Latest Ref Range: 95 - 110 mmol/L 109   CO2 Latest Ref Range: 23 - 29 mmol/L 25   Anion Gap Latest Ref Range: 8 - 16 mmol/L 7 (L)   BUN, Bld Latest Ref Range: 8 - 23 mg/dL 27 (H)   Creatinine Latest Ref Range: 0.5 - 1.4 mg/dL 1.4   eGFR if non African American Latest Ref Range: >60 mL/min/1.73 m^2 34 (A)   eGFR if African American Latest Ref Range: >60 mL/min/1.73 m^2 39 (A)   Glucose Latest Ref Range: 70 - 110 mg/dL 171 (H)   Calcium Latest Ref Range: 8.7 - 10.5 mg/dL 11.2 (H)   Alkaline Phosphatase Latest Ref Range: 55 - 135 U/L 63   Total Protein Latest Ref Range: 6.0 - 8.4 g/dL 7.4   Albumin Latest Ref Range: 3.5 - 5.2 g/dL 3.7   Total Bilirubin Latest Ref Range: 0.1 - 1.0 mg/dL 0.4   AST Latest Ref Range: 10 - 40 U/L 20   ALT Latest Ref Range: 10 - 44 U/L 14     Urine Calcium(2/1/2018):  133  ASSESSMENT/PLAN:     Sakshi Marcial is a 86 y.o. side effects with likely primary hyperparathyroidism.  Her indication for  surgery would be her history of prior kidney stones.      She is interested in the least invasive treatment for her underlying condition, stating she would rather not have surgery if it is possible, however is amenable to surgical treatment if it is her only option.    I discussed at length with her the underlying pathophysiology of primary hyperparathyroidism and the necessary steps required prior to proceeding with surgery. Specifically the need to rule out any concomitant thyroid pathology with an US of her her neck, which may help with localization of the likely parathyroid adenoma, as well as a nuclear medicine localization scan. Also, were these not to localize the need for a possible CT parathyroid scan.   Would recommend nuclear medicine parathyroid uptake scan as well as thyroid US to aid in localization.  Also ordered bone density scan as pt has not had one performed recently.   Would recommend pt resume follow up with nephrology given elevated Cr and poor GFR, disease is likely 2/2 DMII    She would like to discuss all these things with her family (son) prior to making a final decision. Will follow-up once these tests are complete.    Tulio Will MD PGY IV  [phone]

## 2019-01-28 ENCOUNTER — HOSPITAL ENCOUNTER (OUTPATIENT)
Dept: RADIOLOGY | Facility: HOSPITAL | Age: 84
Discharge: HOME | End: 2019-01-28
Attending: SURGERY
Payer: MEDICARE

## 2019-01-28 ENCOUNTER — HOSPITAL ENCOUNTER (OUTPATIENT)
Dept: RADIOLOGY | Facility: CLINIC | Age: 84
Discharge: HOME | End: 2019-01-28
Attending: SURGERY
Payer: MEDICARE

## 2019-01-28 DIAGNOSIS — E21.3 HYPERPARATHYROIDISM: ICD-10-CM

## 2019-01-28 PROCEDURE — 77080 DEXA BONE DENSITY SPINE HIP: ICD-10-PCS | Mod: 26,,, | Performed by: INTERNAL MEDICINE

## 2019-01-28 PROCEDURE — 76536 US EXAM OF HEAD AND NECK: CPT | Mod: TC

## 2019-01-28 PROCEDURE — 77080 DXA BONE DENSITY AXIAL: CPT | Mod: 26,,, | Performed by: INTERNAL MEDICINE

## 2019-01-28 PROCEDURE — 76536 US EXAM OF HEAD AND NECK: CPT | Mod: 26,,, | Performed by: INTERNAL MEDICINE

## 2019-01-28 PROCEDURE — 76536 US SOFT TISSUE HEAD NECK THYROID: ICD-10-PCS | Mod: 26,,, | Performed by: INTERNAL MEDICINE

## 2019-01-28 PROCEDURE — 78072 PARATHYRD PLANAR W/SPECT&CT: CPT | Mod: 26,,, | Performed by: RADIOLOGY

## 2019-01-28 PROCEDURE — 77080 DXA BONE DENSITY AXIAL: CPT | Mod: TC

## 2019-01-28 PROCEDURE — 78072 PARATHYRD PLANAR W/SPECT&CT: CPT | Mod: TC

## 2019-01-28 PROCEDURE — 78072 NM PARATHYROID SCAN WITH SPECT AND CT: ICD-10-PCS | Mod: 26,,, | Performed by: RADIOLOGY

## 2019-01-29 ENCOUNTER — TELEPHONE (OUTPATIENT)
Dept: SURGERY | Facility: CLINIC | Age: 84
End: 2019-01-29

## 2019-01-29 NOTE — TELEPHONE ENCOUNTER
Results Call: No answer. Left brief message. Will await a return call.     Unable to leave a message on cell #.

## 2019-01-30 ENCOUNTER — TELEPHONE (OUTPATIENT)
Dept: SURGERY | Facility: CLINIC | Age: 84
End: 2019-01-30

## 2019-01-30 NOTE — TELEPHONE ENCOUNTER
Spoke with pt to schedule for Parathyroid Protocol CT Scan. She will call back once she secures a ride.

## 2019-01-30 NOTE — TELEPHONE ENCOUNTER
Pt called back and said she has a ride to bring her tomorrow. She's scheduled for a Parathyroid Protocol CT Scan. She knows to fast 4 hours before. She knows to arrive 30 minutes before. She knows to have her blood drawn in the [facility] lab before her scan in the [facility].

## 2019-01-31 ENCOUNTER — HOSPITAL ENCOUNTER (OUTPATIENT)
Dept: RADIOLOGY | Facility: HOSPITAL | Age: 84
Discharge: HOME | End: 2019-01-31
Attending: SURGERY
Payer: MEDICARE

## 2019-01-31 DIAGNOSIS — E21.3 HYPERPARATHYROIDISM: ICD-10-CM

## 2019-01-31 LAB
CREAT SERPL-MCNC: 1.4 MG/DL (ref 0.5–1.4)
SAMPLE: NORMAL

## 2019-01-31 PROCEDURE — 70492 CT SFT TSUE NCK W/O & W/DYE: CPT | Mod: TC

## 2019-01-31 PROCEDURE — 25500020 PHARM REV CODE 255: Performed by: SURGERY

## 2019-01-31 PROCEDURE — 70492 CT NECK PARATHYROID (4D): ICD-10-PCS | Mod: 26,,, | Performed by: RADIOLOGY

## 2019-01-31 PROCEDURE — 70492 CT SFT TSUE NCK W/O & W/DYE: CPT | Mod: 26,,, | Performed by: RADIOLOGY

## 2019-01-31 RX ADMIN — IOHEXOL 100 ML: 350 INJECTION, SOLUTION INTRAVENOUS at 11:01

## 2019-02-01 ENCOUNTER — OFFICE VISIT (OUTPATIENT)
Dept: URGENT CARE | Facility: CLINIC | Age: 84
End: 2019-02-01
Payer: MEDICARE

## 2019-02-01 ENCOUNTER — TELEPHONE (OUTPATIENT)
Dept: SURGERY | Facility: CLINIC | Age: 84
End: 2019-02-01

## 2019-02-01 VITALS
HEART RATE: 68 BPM | BODY MASS INDEX: 33.49 KG/M2 | RESPIRATION RATE: 18 BRPM | SYSTOLIC BLOOD PRESSURE: 114 MMHG | WEIGHT: 182 LBS | HEIGHT: 62 IN | DIASTOLIC BLOOD PRESSURE: 61 MMHG | OXYGEN SATURATION: 98 % | TEMPERATURE: 98 F

## 2019-02-01 DIAGNOSIS — R22.1 LOCALIZED SWELLING, MASS OR LUMP OF NECK: Primary | ICD-10-CM

## 2019-02-01 PROCEDURE — 99214 OFFICE O/P EST MOD 30 MIN: CPT | Mod: S$GLB,,, | Performed by: NURSE PRACTITIONER

## 2019-02-01 PROCEDURE — 99214 PR OFFICE/OUTPT VISIT, EST, LEVL IV, 30-39 MIN: ICD-10-PCS | Mod: S$GLB,,, | Performed by: NURSE PRACTITIONER

## 2019-02-01 PROCEDURE — 1101F PR PT FALLS ASSESS DOC 0-1 FALLS W/OUT INJ PAST YR: ICD-10-PCS | Mod: CPTII,S$GLB,, | Performed by: NURSE PRACTITIONER

## 2019-02-01 PROCEDURE — 1101F PT FALLS ASSESS-DOCD LE1/YR: CPT | Mod: CPTII,S$GLB,, | Performed by: NURSE PRACTITIONER

## 2019-02-01 NOTE — TELEPHONE ENCOUNTER
Returned pt call. She said she woke up this morning with a 'big' knot on each side of her neck. She went to Urgent Care. She's there now. She will follow-up as needed.

## 2019-02-01 NOTE — PATIENT INSTRUCTIONS
Salivary Gland Swelling, Uncertain Cause  Salivary glands make saliva in response to food in your mouth. Saliva is mostly water. It also has minerals and proteins that help break down food and keep the mouth and teeth healthy. There are three pairs of salivary glands:  · Parotid glands (in front of the ear)  · Submandibular glands (below the jaw)  · Sublingual glands (below the tongue)  Each gland has a duct (channel) that carries saliva from the gland into the mouth.   Swelling of the salivary glands can sometimes occur. Causes can include:  · Viral infection (such as childhood mumps)  · Bacterial infections  · Sjögren's syndrome  · Diabetes  · Malnutrition  · Sarcoidosis  · Blockage of the salivary duct (from stones or tumors)  Certain medicines can affect salivary flow. This can lead to swelling of the gland. Be sure to tell your healthcare provider about all of the medicines you take.  Tests are being done to determine the cause of the swelling. These may include blood tests, X-ray, ultrasound, CT scan, or injection of dye into the duct to look for blockage. Treatment depends on the exact cause of the swelling.  Home care  · If the area is painful, you can take over-the-counter medicines, such as acetaminophen or ibuprofen, unless you were prescribed another medicine. Wetting a cloth with warm water and putting it over the affected gland for 10-15 minutes at a time can also help ease pain.  · To help prevent blockages and infections:  ¨ Drink 6-8 glasses of fluid per day (such as water, tea, and clear soup) to keep well hydrated.  ¨ If you smoke, ask your healthcare provider for help to quit. Smoking makes salivary gland stones more likely.  ¨ Maintain good dental hygiene. Brush and floss your teeth daily. See your dentist for regular cleanings.  Follow-up care  Follow up with your healthcare provider or as advised. See your healthcare provider for further exams and testing. If you have been referred  to a specialist, make an appointment promptly.  When to seek medical advice  Call your healthcare provider if any of the following occur:  · Increasing pain or swelling in the gland  · Inability to open mouth or pain when opening mouth  · Fever of 100.4°F (38ºC) or higher, or as directed by your healthcare provider  · Redness over the gland  · Pus draining into the mouth  · Trouble breathing or swallowing  · Any new symptoms  Date Last Reviewed: 5/4/2015  © 0361-8814 [email or web address]. [address] [place] [address]. All rights reserved. This information is not intended as a substitute for professional medical care. Always follow your healthcare professional's instructions.

## 2019-02-01 NOTE — PROGRESS NOTES
"Subjective:       Patient ID: Sakshi Marcial is a 86 y.o. female.    Vitals:  height is 5' 2" (1.575 m) and weight is 82.6 kg (182 lb). Her temperature is 97.6 °F (36.4 °C). Her blood pressure is 114/61 and her pulse is 68. Her respiration is 18 and oxygen saturation is 98%.     Chief Complaint: Allergic Reaction    Pt states that she had a CT done yesterday and they used IV contrast . Pt states that she has never had contrast used for a procedure before. Pt stated that when she got home she ate oysters (fried) and about an hour later she vomited. Pt states that when she woke up this morning her neck was swollen but denies any trouble swallowing, pain, rash, hives, drooling, fever or wheezing.  Patient reports her neck was not swollen at the time of the parathyroid CT.  Patient has not vomited except that 1 time.  Patient denies allergy to oysters.  Patient is unsure if the the swelling in her neck fluctuates with eating or not.      Allergic Reaction   This is a new problem. The current episode started yesterday. The problem has been gradually worsening since onset. The problem is severe. The patient was exposed to shellfish. The exposure occurred at home. Associated symptoms include trouble swallowing and vomiting. Pertinent negatives include no chest pain, coughing, diarrhea or rash. Past treatments include nothing. The treatment provided no relief. There is no history of seasonal allergies. Swelling location: neck.       Constitution: Negative for chills, fatigue and fever.   HENT: Positive for trouble swallowing. Negative for congestion and sore throat.    Neck: Positive for neck swelling. Negative for painful lymph nodes.   Cardiovascular: Negative for chest pain and leg swelling.   Eyes: Negative for double vision and blurred vision.   Respiratory: Negative for cough and shortness of breath.    Gastrointestinal: Positive for vomiting. Negative for nausea and diarrhea.   Genitourinary: Negative for dysuria, " frequency, urgency and history of kidney stones.   Musculoskeletal: Negative for joint pain, joint swelling, muscle cramps and muscle ache.   Skin: Negative for color change, pale, rash and bruising.   Allergic/Immunologic: Negative for seasonal allergies.   Neurological: Negative for dizziness, history of vertigo, light-headedness, passing out and headaches.   Hematologic/Lymphatic: Negative for swollen lymph nodes.   Psychiatric/Behavioral: Negative for nervous/anxious, sleep disturbance and depression. The patient is not nervous/anxious.        Objective:      Physical Exam   Constitutional: She is oriented to person, place, and time. She appears well-developed and well-nourished. She is cooperative.  Non-toxic appearance. She does not appear ill. No distress.   HENT:   Head: Normocephalic and atraumatic.   Right Ear: Hearing, tympanic membrane, external ear and ear canal normal.   Left Ear: Hearing, tympanic membrane, external ear and ear canal normal.   Nose: Nose normal. No mucosal edema, rhinorrhea or nasal deformity. No epistaxis. Right sinus exhibits no maxillary sinus tenderness and no frontal sinus tenderness. Left sinus exhibits no maxillary sinus tenderness and no frontal sinus tenderness.   Mouth/Throat: Uvula is midline, oropharynx is clear and moist and mucous membranes are normal. No trismus in the jaw. Normal dentition. No uvula swelling. No oropharyngeal exudate, posterior oropharyngeal edema or posterior oropharyngeal erythema.   Patent airway   Eyes: Conjunctivae and lids are normal. No scleral icterus.   Sclera clear bilat   Neck: Trachea normal, full passive range of motion without pain and phonation normal. Neck supple.       Cardiovascular: Normal rate, regular rhythm, normal heart sounds, intact distal pulses and normal pulses.   Pulmonary/Chest: Effort normal and breath sounds normal. No respiratory distress.   Abdominal: Soft. Normal appearance and bowel sounds are normal. She exhibits no  distension. There is no tenderness.   Musculoskeletal: Normal range of motion. She exhibits no edema or deformity.   Neurological: She is alert and oriented to person, place, and time. She exhibits normal muscle tone. Coordination normal.   Skin: Skin is warm, dry and intact. She is not diaphoretic. No pallor.   Psychiatric: She has a normal mood and affect. Her speech is normal and behavior is normal. Judgment and thought content normal. Cognition and memory are normal.   Nursing note and vitals reviewed.      Assessment:       1. Localized swelling, mass or lump of neck        Plan:         Localized swelling, mass or lump of neck     Patient structures follow up with Monday endocrinologist who ordered the CT of the parathyroid.  Patient was instructed to go to the emergency room if she experiences drooling, difficulty breathing, difficulty swallowing, stridor, pain and neck or fever.  Patient was with daughter verbalizes understanding.   Patient Instructions             Salivary Gland Swelling, Uncertain Cause  Salivary glands make saliva in response to food in your mouth. Saliva is mostly water. It also has minerals and proteins that help break down food and keep the mouth and teeth healthy. There are three pairs of salivary glands:  · Parotid glands (in front of the ear)  · Submandibular glands (below the jaw)  · Sublingual glands (below the tongue)  Each gland has a duct (channel) that carries saliva from the gland into the mouth.   Swelling of the salivary glands can sometimes occur. Causes can include:  · Viral infection (such as childhood mumps)  · Bacterial infections  · Sjögren's syndrome  · Diabetes  · Malnutrition  · Sarcoidosis  · Blockage of the salivary duct (from stones or tumors)  Certain medicines can affect salivary flow. This can lead to swelling of the gland. Be sure to tell your healthcare provider about all of the medicines you take.  Tests are being done to determine the cause of the  swelling. These may include blood tests, X-ray, ultrasound, CT scan, or injection of dye into the duct to look for blockage. Treatment depends on the exact cause of the swelling.  Home care  · If the area is painful, you can take over-the-counter medicines, such as acetaminophen or ibuprofen, unless you were prescribed another medicine. Wetting a cloth with warm water and putting it over the affected gland for 10-15 minutes at a time can also help ease pain.  · To help prevent blockages and infections:  ¨ Drink 6-8 glasses of fluid per day (such as water, tea, and clear soup) to keep well hydrated.  ¨ If you smoke, ask your healthcare provider for help to quit. Smoking makes salivary gland stones more likely.  ¨ Maintain good dental hygiene. Brush and floss your teeth daily. See your dentist for regular cleanings.  Follow-up care  Follow up with your healthcare provider or as advised. See your healthcare provider for further exams and testing. If you have been referred to a specialist, make an appointment promptly.  When to seek medical advice  Call your healthcare provider if any of the following occur:  · Increasing pain or swelling in the gland  · Inability to open mouth or pain when opening mouth  · Fever of 100.4°F (38ºC) or higher, or as directed by your healthcare provider  · Redness over the gland  · Pus draining into the mouth  · Trouble breathing or swallowing  · Any new symptoms  Date Last Reviewed: 5/4/2015  © 1053-1286 [employer]. [address] [place] [address], [place] [address]. All rights reserved. This information is not intended as a substitute for professional medical care. Always follow your healthcare professional's instructions.

## 2019-02-04 ENCOUNTER — TELEPHONE (OUTPATIENT)
Dept: RADIOLOGY | Facility: CLINIC | Age: 84
End: 2019-02-04

## 2019-02-04 ENCOUNTER — DOCUMENTATION ONLY (OUTPATIENT)
Dept: NEPHROLOGY | Facility: CLINIC | Age: 84
End: 2019-02-04

## 2019-02-04 NOTE — TELEPHONE ENCOUNTER
Called pt on behalf of Dr. Prieto to provide the following report:           Please inform the patient that her bone density showed some bone loss(osteopenia) at the wrist and hip.  In addition, the CT scan she had demonstrated a possible abnormal parathyroid gland that is over-producing.  She meets criteria for considering surgery because of her kidney stones.  The options include parathyroid surgery(to remove the overproducing gland(s) vs continued observation.   She was discussing with her son her next steps.  In the interim, she was noted to have some lung changes(emphysema) on the CT scan that she should make sure to follow-up with her PCP about.  She should let us know how she would like to proceed.  She should also let us know if she has any questions.     Best,     aob      Pt sates she would like to watch it (overproducing parathyroid gland) for now to see if it changes or remain the same.Will decide later if she would to proceed with surgery later, if needed. She will let her PCP @ [facility] know about her emphaysema. She says they're able to see her records from [facility]. She has an appt coming up with her.

## 2019-02-04 NOTE — PROGRESS NOTES
Renal mass  Followed by urology    Hypercalcemia not on cinalcet  Suggest restarting if not to have parathyroidectomy.    I asked her to make an appointment in [facility]u office as she last saw me 8/2017

## 2019-02-26 DIAGNOSIS — E83.52 HYPERCALCEMIA: Primary | ICD-10-CM

## 2019-03-12 ENCOUNTER — LAB VISIT (OUTPATIENT)
Dept: LAB | Facility: HOSPITAL | Age: 84
End: 2019-03-12
Attending: INTERNAL MEDICINE
Payer: MEDICARE

## 2019-03-12 DIAGNOSIS — E83.52 HYPERCALCEMIA: ICD-10-CM

## 2019-03-12 LAB
ALBUMIN SERPL BCP-MCNC: 3.8 G/DL
ANION GAP SERPL CALC-SCNC: 6 MMOL/L
BUN SERPL-MCNC: 21 MG/DL
CA-I BLDV-SCNC: 1.47 MMOL/L
CALCIUM SERPL-MCNC: 11.2 MG/DL
CHLORIDE SERPL-SCNC: 103 MMOL/L
CO2 SERPL-SCNC: 26 MMOL/L
CREAT SERPL-MCNC: 1.5 MG/DL
EST. GFR  (AFRICAN AMERICAN): 36.1 ML/MIN/1.73 M^2
EST. GFR  (NON AFRICAN AMERICAN): 31.3 ML/MIN/1.73 M^2
GLUCOSE SERPL-MCNC: 205 MG/DL
PHOSPHATE SERPL-MCNC: 2.7 MG/DL
POTASSIUM SERPL-SCNC: 4.1 MMOL/L
PTH-INTACT SERPL-MCNC: 170 PG/ML
SODIUM SERPL-SCNC: 135 MMOL/L

## 2019-03-12 PROCEDURE — 82330 ASSAY OF CALCIUM: CPT

## 2019-03-12 PROCEDURE — 82652 VIT D 1 25-DIHYDROXY: CPT

## 2019-03-12 PROCEDURE — 80069 RENAL FUNCTION PANEL: CPT

## 2019-03-12 PROCEDURE — 83970 ASSAY OF PARATHORMONE: CPT

## 2019-03-13 ENCOUNTER — OFFICE VISIT (OUTPATIENT)
Dept: NEPHROLOGY | Facility: CLINIC | Age: 84
End: 2019-03-13
Payer: MEDICARE

## 2019-03-13 VITALS
SYSTOLIC BLOOD PRESSURE: 110 MMHG | DIASTOLIC BLOOD PRESSURE: 62 MMHG | BODY MASS INDEX: 34.08 KG/M2 | HEART RATE: 73 BPM | HEIGHT: 62 IN | OXYGEN SATURATION: 97 % | WEIGHT: 185.19 LBS

## 2019-03-13 DIAGNOSIS — E11.22 TYPE 2 DM WITH CKD STAGE 3 AND HYPERTENSION: ICD-10-CM

## 2019-03-13 DIAGNOSIS — E83.52 HYPERCALCEMIA: Primary | ICD-10-CM

## 2019-03-13 DIAGNOSIS — N18.30 TYPE 2 DM WITH CKD STAGE 3 AND HYPERTENSION: ICD-10-CM

## 2019-03-13 DIAGNOSIS — I12.9 TYPE 2 DM WITH CKD STAGE 3 AND HYPERTENSION: ICD-10-CM

## 2019-03-13 DIAGNOSIS — E21.3 HYPERPARATHYROIDISM: ICD-10-CM

## 2019-03-13 PROCEDURE — 99213 PR OFFICE/OUTPT VISIT, EST, LEVL III, 20-29 MIN: ICD-10-PCS | Mod: S$GLB,,, | Performed by: INTERNAL MEDICINE

## 2019-03-13 PROCEDURE — 99213 OFFICE O/P EST LOW 20 MIN: CPT | Mod: S$GLB,,, | Performed by: INTERNAL MEDICINE

## 2019-03-13 PROCEDURE — 99999 PR PBB SHADOW E&M-EST. PATIENT-LVL III: CPT | Mod: PBBFAC,,, | Performed by: INTERNAL MEDICINE

## 2019-03-13 PROCEDURE — 1101F PR PT FALLS ASSESS DOC 0-1 FALLS W/OUT INJ PAST YR: ICD-10-PCS | Mod: CPTII,S$GLB,, | Performed by: INTERNAL MEDICINE

## 2019-03-13 PROCEDURE — 99999 PR PBB SHADOW E&M-EST. PATIENT-LVL III: ICD-10-PCS | Mod: PBBFAC,,, | Performed by: INTERNAL MEDICINE

## 2019-03-13 PROCEDURE — 1101F PT FALLS ASSESS-DOCD LE1/YR: CPT | Mod: CPTII,S$GLB,, | Performed by: INTERNAL MEDICINE

## 2019-03-13 RX ORDER — CINACALCET 30 MG/1
30 TABLET, FILM COATED ORAL
Qty: 60 TABLET | Refills: 0 | Status: SHIPPED | OUTPATIENT
Start: 2019-03-13 | End: 2019-07-05

## 2019-03-13 NOTE — PROGRESS NOTES
Subjective:       Patient ID: Sakshi Marcial is a 86 y.o. Black or  female who presents for f/u evaluation of renal function.    HPI H/o HTN, NIDDM  Without retinopathy, recurrent UTIs, last UTI one month ago associated with n&v  s/p cataract removal, seen in an outside clinic.  She comes with out records, labs and is not sure of her medications.  PSH: cataracts bilateral, hysterectomy/oopherectomies  SH:  1/2 PPD  30 yrs  Stopped 3 years  FH:  DM,   ALL; NKA    Interval hx:     3/13/19  Serum calcium 11.2 iCa 1.47, alb 3.8    off cinalcet     UA +3 glu  bs 205 , HbgA1c 6.5  Renal mass   Enlarging exophytic solid mass within the right midpole measuring up to 2.3 cm in greatest dimension, concerning for renal cell carcinoma  Patient seen by urology and  did not want to pursue surgery. F/u renal us 6 mo suggested      endocrine surg no PTH mass found, f/u medically for now    No further nephrolithaisis    No dysuria, hematuria, frothy foamy urine  nor flank pain      8/2017  R hydronephrosis , stone and renal mass seen by urology, S/p cystoscopy and stent with stone removal. Serum crt down to 1.4,  Serum calcium 11.1- 10.7no LUTS, dysuria.  She now has another stone seen in the right kidney nonobstructing 6 mm pink followed by urology serum PTH is come back at 144, RACHANA related protein 0.4 I still do not have any urine studies . serum calcium remains about 11.  She is still taking vitamin D 2000 units once daily claims there is no other calcium product or vitamins that she is taking  She is not aware that she needs to f/u for further eval of renal mass with urology.    + CRISTO 1:160 and + SSb antibody,  .13 UPRCT, 24 hr urine for protein not completed.  + general arthralgias at time  UA + WBC 20 pHPF and bacteria, asymptomatic.    No SOB, CP, leg swelling. Her bp at home is 120-130 systolic usually and she took her meds this am, but rushed to make her appointment on time.    Review of Systems    Constitutional: Negative for appetite change, chills, fatigue, fever and unexpected weight change.   HENT: Negative for congestion, facial swelling, hearing loss, nosebleeds and trouble swallowing.    Eyes: Negative for pain, discharge, redness and visual disturbance.   Respiratory: Negative for cough, chest tightness, shortness of breath and wheezing.    Cardiovascular: Negative for chest pain, palpitations and leg swelling.   Gastrointestinal: Negative for abdominal pain, constipation, diarrhea, nausea and vomiting.   Endocrine: Negative for cold intolerance, heat intolerance and polydipsia.   Genitourinary: Negative for decreased urine volume, difficulty urinating, dysuria, flank pain, hematuria and urgency.   Musculoskeletal: Negative for arthralgias, back pain, joint swelling and myalgias.   Skin: Negative for color change, pallor, rash and wound.   Neurological: Negative for dizziness, tremors, seizures, syncope, speech difficulty, weakness and headaches.   Hematological: Negative for adenopathy. Does not bruise/bleed easily.   Psychiatric/Behavioral: Negative for agitation, behavioral problems, dysphoric mood, self-injury and sleep disturbance.       Objective:       Physical Exam   Constitutional: She is oriented to person, place, and time. She appears well-developed and well-nourished. No distress.   HENT:   Head: Normocephalic and atraumatic.   Mouth/Throat: No oropharyngeal exudate.   Eyes: Conjunctivae and EOM are normal. Pupils are equal, round, and reactive to light. Right eye exhibits no discharge. Left eye exhibits no discharge. No scleral icterus.   Neck: Normal range of motion. Neck supple. No JVD present. No tracheal deviation present. No thyromegaly present.   Cardiovascular: Normal rate, regular rhythm and intact distal pulses. Exam reveals no gallop.   Murmur heard.  2/6 early AYANNA at [facility]  No peripheral edema   DP +1  bilateral   Pulmonary/Chest: Effort normal and breath sounds normal. No  stridor. No respiratory distress. She has no wheezes. She has no rales. She exhibits no tenderness.   Abdominal: Soft. Bowel sounds are normal. She exhibits no distension and no mass. There is no tenderness. There is no rebound and no guarding. No hernia.   Musculoskeletal: She exhibits no edema or tenderness.   Lymphadenopathy:     She has no cervical adenopathy.   Neurological: She is alert and oriented to person, place, and time. She exhibits normal muscle tone.   Skin: Skin is warm and dry. No rash noted. She is not diaphoretic. No erythema.   Psychiatric: She has a normal mood and affect. Judgment and thought content normal.   Nursing note and vitals reviewed.      Assessment:       1. Hypercalcemia    2. Hyperparathyroidism    3. Type 2 DM with CKD stage 3 and hypertension        Plan:      85 yo AAF here for evaluation of renal function with NIDDM, HTN, HPL, and d/c smoking in the lst 3 years, but no known h/o renal disease.  With a h/o  recurrent UTIs., R hydorureter secondary to nephrolithiasis, now resolved after stent/retreveial with improvement in renal function serum crt 1.8--> 1.4    Nephrolithiasis: 95% calcium oxalate with hypercalcemia, 2016, none further    her serum immunofixation was negative for monoclonal protein.   Increase po fluids to > 2.5 L daily and drink into the night  followup urology as suggested about June 2018    Hypercalcemia: begin sensipar  Check serum calcium in 4 and 8 weeks, and if stable every 3 months   followup bone density suggest prolia if osteoporosis present and will need to carefully check calcium and magnesium levels   Suggest holding vit d unless level is low or low normal and serum calcium is better controlled         Positive CRISTO with SSA pattern patient complains that she has some joint pain and knee pain occasional dry mouth and dry eyes.  This may need further evaluation I rheumatology  Proteinuria is stable and less than 300 mg continue to monitor serially, on  losartan       Renal mass repeat US, f/u urology repeat ultrasound 6 month        1. CKD: stage 3 secondary to DM nephropathy and HTN and age related renal mass reduciton, stable  30min spent with patient, more than 50% of the time counseling on sodium/calorie restriction, weight loss and activity for better blood pressure control and prevention of progression of kidney disease     Lab Results   Component Value Date    CREATININE 1.5 (H) 03/12/2019         Proteinuria: stable   Prot/Creat Ratio, Ur   Date Value Ref Range Status   03/12/2019 0.09 0.00 - 0.20 Final   05/29/2017 0.21 (H) 0.00 - 0.20 Final   05/01/2017 0.13 0.00 - 0.20 Final          HTN: self monitoring discussed      Medication: no change      Monitor BP as directed in instructions    Metabolic acidosis: none       Hyponatremia:  In part related to bs  corrected 136-137, follow on lasix, avoid SRIs and thiazide diuretics, despite h/o stone disease may need to reevaluate water intake  If low sodium persists        Hyperkalemia:     Lab Results   Component Value Date     (L) 03/12/2019    K 4.1 03/12/2019    CO2 26 03/12/2019         Renal osteodystrophy secondary hyperparathyroidism: w/u for hypercalcemia as above, hold vit d products for now   Add sensipar 30 mg daily   cosndier prolia if indicated by bone densitometry  Lab Results   Component Value Date    .0 (H) 03/12/2019    CALCIUM 11.2 (H) 03/12/2019    CAION 1.47 (H) 03/12/2019    PHOS 2.7 03/12/2019        Anemia: none follwoup GI eval and iron stores as indicated   Lab Results   Component Value Date    HGB 11.8 (L) 12/13/2018           DM:  Metformin max dose 1000mg daily with gfr <45, d/c for gfr < 30 ml/min               Avoid long acting sulfonylurea        Hyperlipidemia: On atorvastatin  No results found for: LDLCALC      D iet:  Education/referral:       Sodium: 2 gm       Potassium:      Phosphorus: 1000 mg       Protein:      Fluid: 2 L daily          PCP followup:      Referrals: rheumatology      Please avoid or minimize all NSAIDS (ibuprofen, motrin, aleve, indocin, naprosyn) to minimize the risk to your kidneys.        Althea avoid and minimize use of all Proton Pump inhibitors (such as nexium prilosec, omeprazole, pantroprazole, protonix)and Please speak with your PCP about switching to H2 blocker such as Pepcid        Follow up in 6 months  rfp 4 weeks and 8 weeks to check serum calcium level on sensipar      copy of progress to [facility]:  Gabriela MCCABE  [phone]  Fax; [phone]

## 2019-03-14 LAB — 1,25(OH)2D3 SERPL-MCNC: 54 PG/ML

## 2019-03-22 ENCOUNTER — TELEPHONE (OUTPATIENT)
Dept: NEPHROLOGY | Facility: CLINIC | Age: 84
End: 2019-03-22

## 2019-04-03 ENCOUNTER — LAB VISIT (OUTPATIENT)
Dept: LAB | Facility: HOSPITAL | Age: 84
End: 2019-04-03
Attending: INTERNAL MEDICINE
Payer: MEDICARE

## 2019-04-03 DIAGNOSIS — E11.22 TYPE 2 DM WITH CKD STAGE 3 AND HYPERTENSION: ICD-10-CM

## 2019-04-03 DIAGNOSIS — E21.3 HYPERPARATHYROIDISM: ICD-10-CM

## 2019-04-03 DIAGNOSIS — N18.30 TYPE 2 DM WITH CKD STAGE 3 AND HYPERTENSION: ICD-10-CM

## 2019-04-03 DIAGNOSIS — E83.52 HYPERCALCEMIA: ICD-10-CM

## 2019-04-03 DIAGNOSIS — I12.9 TYPE 2 DM WITH CKD STAGE 3 AND HYPERTENSION: ICD-10-CM

## 2019-04-03 LAB
ALBUMIN SERPL BCP-MCNC: 3.9 G/DL (ref 3.5–5.2)
ANION GAP SERPL CALC-SCNC: 7 MMOL/L (ref 8–16)
BUN SERPL-MCNC: 22 MG/DL (ref 8–23)
CALCIUM SERPL-MCNC: 10.6 MG/DL (ref 8.7–10.5)
CHLORIDE SERPL-SCNC: 107 MMOL/L (ref 95–110)
CO2 SERPL-SCNC: 27 MMOL/L (ref 23–29)
CREAT SERPL-MCNC: 1.5 MG/DL (ref 0.5–1.4)
EST. GFR  (AFRICAN AMERICAN): 36.1 ML/MIN/1.73 M^2
EST. GFR  (NON AFRICAN AMERICAN): 31.3 ML/MIN/1.73 M^2
GLUCOSE SERPL-MCNC: 121 MG/DL (ref 70–110)
PHOSPHATE SERPL-MCNC: 2.8 MG/DL (ref 2.7–4.5)
POTASSIUM SERPL-SCNC: 4.3 MMOL/L (ref 3.5–5.1)
SODIUM SERPL-SCNC: 141 MMOL/L (ref 136–145)

## 2019-04-03 PROCEDURE — 80069 RENAL FUNCTION PANEL: CPT

## 2019-04-03 PROCEDURE — 36415 COLL VENOUS BLD VENIPUNCTURE: CPT | Mod: PO

## 2019-06-11 ENCOUNTER — TELEPHONE (OUTPATIENT)
Dept: UROLOGY | Facility: CLINIC | Age: 84
End: 2019-06-11

## 2019-06-11 NOTE — TELEPHONE ENCOUNTER
LMOR asking to call back and schedule appt with Dr. Charles for renal cyst.  LM that she will need to get all records from Dr. Aviles, including disk of any radiology done.

## 2019-06-11 NOTE — TELEPHONE ENCOUNTER
----- Message from Evelyne Young sent at 6/11/2019  1:21 PM CDT -----  Contact: Self            Name of Who is Calling: CRISTINA SANCHEZ [[id number]]      What is the request in detail: Pt states Dr. Aviles at [facility] spoke with Dr. Charles on Friday regarding the pt. Pt is unsure what the reason, only states she knows she has a cyst on her kidney and she believes they are checking her for cancer. Please contact to further discuss and advise.        Can the clinic reply by [facility]: N      What Number to Call Back if not in [facility]: [phone]

## 2019-06-12 ENCOUNTER — TELEPHONE (OUTPATIENT)
Dept: UROLOGY | Facility: CLINIC | Age: 84
End: 2019-06-12

## 2019-06-12 NOTE — TELEPHONE ENCOUNTER
----- Message from Jena Alberts sent at 6/12/2019  3:37 PM CDT -----  Contact: self  Type:  Patient Returning Call    Who Called: CRISTINA SANCHEZ [[id number]]    Who Left Message for Patient: claudia    Does the patient know what this is regarding?: Y    Best Call Back Number: [phone]    Additional Information:

## 2019-07-05 ENCOUNTER — OFFICE VISIT (OUTPATIENT)
Dept: UROLOGY | Facility: CLINIC | Age: 84
End: 2019-07-05
Payer: MEDICARE

## 2019-07-05 VITALS
HEIGHT: 62 IN | WEIGHT: 185.19 LBS | BODY MASS INDEX: 34.08 KG/M2 | SYSTOLIC BLOOD PRESSURE: 151 MMHG | HEART RATE: 67 BPM | DIASTOLIC BLOOD PRESSURE: 68 MMHG

## 2019-07-05 DIAGNOSIS — N28.89 RENAL MASS, RIGHT: Primary | ICD-10-CM

## 2019-07-05 DIAGNOSIS — N18.30 CKD (CHRONIC KIDNEY DISEASE) STAGE 3, GFR 30-59 ML/MIN: ICD-10-CM

## 2019-07-05 LAB
BILIRUB SERPL-MCNC: ABNORMAL MG/DL
BLOOD URINE, POC: ABNORMAL
COLOR, POC UA: ABNORMAL
GLUCOSE UR QL STRIP: 1000
KETONES UR QL STRIP: ABNORMAL
LEUKOCYTE ESTERASE URINE, POC: ABNORMAL
NITRITE, POC UA: ABNORMAL
PH, POC UA: 5
PROTEIN, POC: ABNORMAL
SPECIFIC GRAVITY, POC UA: 1.02
UROBILINOGEN, POC UA: ABNORMAL

## 2019-07-05 PROCEDURE — 81002 POCT URINE DIPSTICK WITHOUT MICROSCOPE: ICD-10-PCS | Mod: S$GLB,,, | Performed by: UROLOGY

## 2019-07-05 PROCEDURE — 99214 OFFICE O/P EST MOD 30 MIN: CPT | Mod: 25,S$GLB,, | Performed by: UROLOGY

## 2019-07-05 PROCEDURE — 1101F PT FALLS ASSESS-DOCD LE1/YR: CPT | Mod: CPTII,S$GLB,, | Performed by: UROLOGY

## 2019-07-05 PROCEDURE — 81002 URINALYSIS NONAUTO W/O SCOPE: CPT | Mod: S$GLB,,, | Performed by: UROLOGY

## 2019-07-05 PROCEDURE — 99214 PR OFFICE/OUTPT VISIT, EST, LEVL IV, 30-39 MIN: ICD-10-PCS | Mod: 25,S$GLB,, | Performed by: UROLOGY

## 2019-07-05 PROCEDURE — 1101F PR PT FALLS ASSESS DOC 0-1 FALLS W/OUT INJ PAST YR: ICD-10-PCS | Mod: CPTII,S$GLB,, | Performed by: UROLOGY

## 2019-07-05 NOTE — PROGRESS NOTES
"Subjective:      Sakshi Marcial is a 86 y.o. female who was referred by Dr Eric Aviles Jr for evaluation of renal mass.    The patient is a diabetic with chronic kidney disease.  She has a small renal mass which has been managed with surveillance by my partners at [facility], Dr. Blair and Dr. Bolden          The following portions of the patient's history were reviewed and updated as appropriate: allergies, current medications, past family history, past medical history, past social history, past surgical history and problem list.    Review of Systems  Constitutional: no fever or chills  ENT: no nasal congestion or sore throat  Respiratory: no cough or shortness of breath  Cardiovascular: no chest pain or palpitations  Gastrointestinal: no nausea or vomiting, tolerating diet  Genitourinary: as per HPI  Hematologic/Lymphatic: no easy bruising or lymphadenopathy  Musculoskeletal: no arthralgias or myalgias  Neurological: no seizures or tremors  Behavioral/Psych: no auditory or visual hallucinations     Objective:   Vital Signs:Ht 5' 2" (1.575 m)   Wt 84 kg (185 lb 3 oz)   BMI 33.87 kg/m²     Physical Exam   General: alert and oriented, no acute distress  Head: normocephalic, atraumatic  Neck: normal ROM  Respiratory: Symmetric expansion, non-labored breathing  Cardiovascular: no peripheral edema  Abdomen:  Nondistended  Skin: normal coloration and turgor, no rashes, no suspicious skin lesions noted  Neuro: alert and oriented x3, no gross deficits  Psych: normal judgment and insight, normal mood/affect and non-anxious    Physical Exam    Lab Review   Urinalysis demonstrates trace leukocytes positive glucose negative nitrite negative blood    Lab Results   Component Value Date    WBC 3.77 (L) 12/13/2018    HGB 11.8 (L) 12/13/2018    HCT 37.3 12/13/2018    MCV 97 12/13/2018     12/13/2018     Lab Results   Component Value Date    CREATININE 1.5 (H) 04/03/2019    BUN 22 04/03/2019 "       Imaging  Impression       Enlarging exophytic solid mass within the right midpole measuring up to 2.3 cm in greatest dimension, concerning for renal cell carcinoma.  If surgery is not an option, consider IR consult for further evaluation of renal mass.    Bilateral renal cysts.    Findings compatible with chronic medical renal disease.    This report was flagged in Epic as abnormal.    Electronically signed by resident: Velma Olivas  Date: 12/03/2018  Time: 11:43    Electronically signed by: Kobe Acosta MD  Date: 12/03/2018  Time: 11:53           Assessment:     1. Renal mass, right; on surveillance   2. CKD (chronic kidney disease) stage 3, GFR 30-59 ml/min      -  Plan:     Orders Placed This Encounter    US Kidney     We discussed the natural history of small renal masses, the risk of malignancy and disease progression, and the small risk of mortality.  We discussed the risks and benefits of watchful waiting, biopsy, partial and total nephrectomy.  We discussed the benefits of renal preservation when possible.  We discussed percutaneous, laparoscopic and robotic approaches.  We discussed the management of positive surgical margins.  I answered all questions.    Given her age and comorbidities, I agree that surveillance seems like a very reasonable approach.  She will return after her repeat ultrasound.  If there is significant enlargement we will consider referral to interventional Radiology to consider ablation

## 2019-07-11 ENCOUNTER — HOSPITAL ENCOUNTER (OUTPATIENT)
Dept: RADIOLOGY | Facility: HOSPITAL | Age: 84
Discharge: HOME | End: 2019-07-11
Attending: UROLOGY
Payer: MEDICARE

## 2019-07-11 DIAGNOSIS — N18.30 CKD (CHRONIC KIDNEY DISEASE) STAGE 3, GFR 30-59 ML/MIN: ICD-10-CM

## 2019-07-11 DIAGNOSIS — N28.89 RENAL MASS, RIGHT: ICD-10-CM

## 2019-07-11 PROCEDURE — 76770 US EXAM ABDO BACK WALL COMP: CPT | Mod: 26,,, | Performed by: RADIOLOGY

## 2019-07-11 PROCEDURE — 76770 US EXAM ABDO BACK WALL COMP: CPT | Mod: TC

## 2019-07-11 PROCEDURE — 76770 US KIDNEY: ICD-10-PCS | Mod: 26,,, | Performed by: RADIOLOGY

## 2019-07-18 ENCOUNTER — TELEPHONE (OUTPATIENT)
Dept: UROLOGY | Facility: CLINIC | Age: 84
End: 2019-07-18

## 2019-07-18 NOTE — TELEPHONE ENCOUNTER
----- Message from Celso Gifford sent at 7/18/2019  2:00 PM CDT -----  Contact: CRISTINA SANCHEZ [[id number]]  Name of Who is Calling:CRISTINA SANCHEZ [[id number]]    What is the request in detail: Pt requesting to speak with staff regarding tomorrow's appointment. Contact at your earliest convenience.    Can the clinic reply by [facility]: N    What [facility] to [facility] Back [facility]: [phone]

## 2019-07-19 ENCOUNTER — OFFICE VISIT (OUTPATIENT)
Dept: UROLOGY | Facility: CLINIC | Age: 84
End: 2019-07-19
Payer: MEDICARE

## 2019-07-19 ENCOUNTER — HOSPITAL ENCOUNTER (OUTPATIENT)
Dept: RADIOLOGY | Facility: OTHER | Age: 84
Discharge: HOME | End: 2019-07-19
Attending: UROLOGY
Payer: MEDICARE

## 2019-07-19 VITALS
BODY MASS INDEX: 33.31 KG/M2 | SYSTOLIC BLOOD PRESSURE: 150 MMHG | HEART RATE: 75 BPM | WEIGHT: 181 LBS | HEIGHT: 62 IN | DIASTOLIC BLOOD PRESSURE: 66 MMHG

## 2019-07-19 DIAGNOSIS — N28.89 RENAL MASS, RIGHT: Primary | ICD-10-CM

## 2019-07-19 DIAGNOSIS — N18.30 CKD (CHRONIC KIDNEY DISEASE) STAGE 3, GFR 30-59 ML/MIN: ICD-10-CM

## 2019-07-19 DIAGNOSIS — N28.89 RENAL MASS, RIGHT: ICD-10-CM

## 2019-07-19 PROCEDURE — 71046 X-RAY EXAM CHEST 2 VIEWS: CPT | Mod: TC,FY

## 2019-07-19 PROCEDURE — 1101F PR PT FALLS ASSESS DOC 0-1 FALLS W/OUT INJ PAST YR: ICD-10-PCS | Mod: CPTII,S$GLB,, | Performed by: UROLOGY

## 2019-07-19 PROCEDURE — 99214 OFFICE O/P EST MOD 30 MIN: CPT | Mod: S$GLB,,, | Performed by: UROLOGY

## 2019-07-19 PROCEDURE — 99214 PR OFFICE/OUTPT VISIT, EST, LEVL IV, 30-39 MIN: ICD-10-PCS | Mod: S$GLB,,, | Performed by: UROLOGY

## 2019-07-19 PROCEDURE — 1101F PT FALLS ASSESS-DOCD LE1/YR: CPT | Mod: CPTII,S$GLB,, | Performed by: UROLOGY

## 2019-07-19 PROCEDURE — 71046 XR CHEST PA AND LATERAL: ICD-10-PCS | Mod: 26,,, | Performed by: RADIOLOGY

## 2019-07-19 PROCEDURE — 71046 X-RAY EXAM CHEST 2 VIEWS: CPT | Mod: 26,,, | Performed by: RADIOLOGY

## 2019-07-19 NOTE — PROGRESS NOTES
"Subjective:      Sakshi Marcial is a 86 y.o. female who returns today regarding her     Follow-up for renal mass on watchful waiting.  The mass is enlarged on ultrasound.  No symptoms of metastatic disease.  No flank pain no hematuria.    The following portions of the patient's history were reviewed and updated as appropriate: allergies, current medications, past family history, past medical history, past social history, past surgical history and problem list.    Review of Systems  Pertinent items are noted in HPI.  A comprehensive multipoint review of systems was negative except as otherwise stated in the HPI.     Objective:   Vitals: BP (!) 150/66   Pulse 75   Ht 5' 2" (1.575 m)   Wt 82.1 kg (181 lb)   BMI 33.11 kg/m²     Physical Exam   General: alert and oriented, no acute distress  Respiratory: Symmetric expansion, non-labored breathing  Cardiovascular: normal to inspection  Abdomen: non distended   Skin: normal coloration and turgor, no rashes, no suspicious skin lesions noted  Neuro: no gross deficits  Psych: normal judgment and insight, normal mood/affect and non-anxious    Physical Exam    Lab Review   Urinalysis demonstrates trace leukocytes and red cells on dipstick and trace protein  Microscopic 0-1 red cells, 0-1 white cells no bacteria    Lab Results   Component Value Date    WBC 3.77 (L) 12/13/2018    HGB 11.8 (L) 12/13/2018    HCT 37.3 12/13/2018    MCV 97 12/13/2018     12/13/2018     Lab Results   Component Value Date    CREATININE 1.5 (H) 04/03/2019    BUN 22 04/03/2019     Chronic hypercalcemia has been present for number of years and is being managed by Nephrology.  It is unlikely that this is directly related to the renal mass    Imaging  Right kidney: The right kidney measures 10.2 cm. Moderate cortical thinning.  There is increased cortical echogenicity and slight loss of distinction between the cortex and medulla.  There is decrease in perfusion..  Resistive index measures " 0.89..  These findings are consistent with chronic renal disease.  The right lower pole solid renal mass again is noted and appears to be increasing in size.  It now measures 3.2 x 3.0 by 2.7 cm.  Previously it was 2.3 x 2.2 by 1.8.  There also appear to be 3 renal cysts which are relatively unchanged.  No renal stone. No hydronephrosis.    Left kidney: The left kidney measures 10.1 cm. There is moderate cortical thinning.  There is increased cortical echogenicity and some loss of distinction between the cortex and medulla.  Decrease in perfusion also noted.  These findings cyst with chronic renal disease.  Resistive index measures 0.86.  There are no solid masses.  There are 2 renal cysts which are relatively unchanged in size the largest measures 1.7 x 1.6 x 1.9 cm and is in the interpolar region.  No renal stone. No hydronephrosis.      Impression       Increasing size of the solid right renal mass.    Bilateral chronic renal disease    Bilateral renal cysts    This report was flagged in Epic as abnormal.      Electronically signed by: Ludin Bailey MD  Date: 07/11/2019  Time: 10:53       Assessment and Plan:   Renal mass, right  -     Ambulatory consult to Interventional Radiology  -     X-Ray Chest PA And Lateral; Future; Expected date: 01/19/2020    CKD (chronic kidney disease) stage 3, GFR 30-59 ml/min  -     Ambulatory consult to Interventional Radiology      We discussed the natural history of small renal masses, the risk of malignancy and disease progression, and the small risk of mortality.  We discussed the risks and benefits of watchful waiting, biopsy, partial and total nephrectomy.  We discussed the benefits of renal preservation when possible.  We discussed percutaneous, laparoscopic and robotic approaches.  We discussed the management of positive surgical margins.  I answered all questions.    Return to clinic after above

## 2019-07-19 NOTE — PROGRESS NOTES
Please tell Ms. Sakshi Marcial that Dr Charles reviewed these results, and he will discuss the results in person at the next appointment.  If she does not have an appointment, please schedule this.

## 2019-08-02 ENCOUNTER — OFFICE VISIT (OUTPATIENT)
Dept: INTERVENTIONAL RADIOLOGY/VASCULAR | Facility: CLINIC | Age: 84
End: 2019-08-02
Payer: MEDICARE

## 2019-08-02 VITALS
DIASTOLIC BLOOD PRESSURE: 70 MMHG | HEIGHT: 62 IN | HEART RATE: 69 BPM | SYSTOLIC BLOOD PRESSURE: 155 MMHG | BODY MASS INDEX: 33.43 KG/M2 | WEIGHT: 181.69 LBS

## 2019-08-02 DIAGNOSIS — N28.89 RENAL MASS, RIGHT: Primary | ICD-10-CM

## 2019-08-02 PROCEDURE — 99999 PR PBB SHADOW E&M-EST. PATIENT-LVL III: CPT | Mod: PBBFAC,,,

## 2019-08-02 PROCEDURE — 1101F PR PT FALLS ASSESS DOC 0-1 FALLS W/OUT INJ PAST YR: ICD-10-PCS | Mod: CPTII,S$GLB,, | Performed by: RADIOLOGY

## 2019-08-02 PROCEDURE — 99999 PR PBB SHADOW E&M-EST. PATIENT-LVL III: ICD-10-PCS | Mod: PBBFAC,,,

## 2019-08-02 PROCEDURE — 99203 OFFICE O/P NEW LOW 30 MIN: CPT | Mod: S$GLB,,, | Performed by: RADIOLOGY

## 2019-08-02 PROCEDURE — 99203 PR OFFICE/OUTPT VISIT, NEW, LEVL III, 30-44 MIN: ICD-10-PCS | Mod: S$GLB,,, | Performed by: RADIOLOGY

## 2019-08-02 PROCEDURE — 1101F PT FALLS ASSESS-DOCD LE1/YR: CPT | Mod: CPTII,S$GLB,, | Performed by: RADIOLOGY

## 2019-08-02 NOTE — H&P
Consult/H&P Note  Interventional Radiology    Consult Requested By: Melvin    Reason for Consult: R renal mass    SUBJECTIVE:     Chief Complaint: R renal mass    History of Present Illness: 87 yo F with R renal mass.  This has slowly increased in size from 1.3x1.2x1.0 cm in 2017, now measuring 3.2x3.0x2.7 cm and is consistent with renal cell carcinoma.    Past Medical History:   Diagnosis Date    Hyperlipidemia     Hypertension     Type 2 diabetes mellitus with chronic kidney disease      Past Surgical History:   Procedure Laterality Date    CYSTOSCOPY WITH STENT PLACEMENT Right 12/16/2016    [facility]    EXTRACTION-STONE-URETEROSCOPY Right 1/16/2017    Performed by Malka Blair MD at [facility] OR [address] [place] [address]    HYSTERECTOMY      LITHOTRIPSY-LASER Right 1/16/2017    [facility]    REMOVAL-STENT Right 1/16/2017    [facility]    REPLACEMENT-STENT Right 1/16/2017    Performed by [facility] at [facility] OR [address] [place] [address]     No family history on file.  Social History     Tobacco Use    Smoking status: Never Smoker    Smokeless tobacco: Former User   Substance Use Topics    Alcohol use: No    Drug use: No       Review of Systems:  Constitutional/General:No fever, chills, change in appetite or weight loss.  Hematological/Immuno: no known coagulopathies  Respiratory: no shortness of breath  Cardiovascular: no chest pain  Gastrointestinal: no abdominal pain  Genito-Urinary: no dysuria  Musculoskeletal: negative  Skin: Negative for rash, itching, pigmentation changes, nail or hair changes.  Neurological: no TIA or stroke symptoms  Psychiatric: normal mood/affect, good insight/judgement      OBJECTIVE:     Vital Signs Range (Last 24H):  [unfilled]    Physical Exam:  General- Patient alert and oriented x3 in NAD  ENT- PERRLA,  Neck- No masses  CV- Regular rate and  rhythm  Resp-  No increased WOB  GI- Non tender/non-distended  Extrem- No cyanosis, clubbing, edema.   Derm- No rashes, masses, or lesions noted  Neuro-  No focal deficits noted.     Physical Exam  Body mass index is 33.23 kg/m².    Scheduled Meds:   Continuous Infusions:   PRN Meds:    Allergies:   Review of patient's allergies indicates:   Allergen Reactions    Iodine Hives       Labs:  @LABRCNTIP(INR, PT, PTT)@  @LABRCNTIP(WBC,HGB,HCT,MCV,PLT)@ @LABRCNTIP(GLU,NA,K,Cl,CO2,BUN,Creatinine,Calcium,MG,ALT,AST,ALBUMIN,bilitot,bilidir)@    Vitals (Most Recent):  Pulse: 69 (08/02/19 1022)  BP: (!) 155/70 (08/02/19 1022)      Consent obtained    ASSESSMENT/PLAN:     Cryoablation of enlarging R renal mass consistent with HCC under general anesthesia.    There are no hospital problems to display for this patient.          Madhu Haider MD

## 2019-08-29 DIAGNOSIS — N28.89 RENAL MASS, RIGHT: Primary | ICD-10-CM

## 2019-09-06 ENCOUNTER — TELEPHONE (OUTPATIENT)
Dept: INTERVENTIONAL RADIOLOGY/VASCULAR | Facility: HOSPITAL | Age: 84
End: 2019-09-06

## 2019-09-06 RX ORDER — POLYETHYLENE GLYCOL 3350 17 G/17G
POWDER, FOR SOLUTION ORAL
COMMUNITY

## 2019-09-07 ENCOUNTER — ANESTHESIA EVENT (OUTPATIENT)
Dept: ENDOSCOPY | Facility: HOSPITAL | Age: 84
End: 2019-09-07
Payer: MEDICARE

## 2019-09-09 ENCOUNTER — HOSPITAL ENCOUNTER (OUTPATIENT)
Facility: HOSPITAL | Age: 84
Discharge: HOME | End: 2019-09-09
Attending: RADIOLOGY | Admitting: RADIOLOGY
Payer: MEDICARE

## 2019-09-09 ENCOUNTER — ANESTHESIA (OUTPATIENT)
Dept: ENDOSCOPY | Facility: HOSPITAL | Age: 84
End: 2019-09-09
Payer: MEDICARE

## 2019-09-09 VITALS
TEMPERATURE: 98 F | HEIGHT: 62 IN | HEART RATE: 65 BPM | BODY MASS INDEX: 33.31 KG/M2 | RESPIRATION RATE: 18 BRPM | OXYGEN SATURATION: 95 % | SYSTOLIC BLOOD PRESSURE: 120 MMHG | WEIGHT: 181 LBS | DIASTOLIC BLOOD PRESSURE: 56 MMHG

## 2019-09-09 DIAGNOSIS — N28.89 RIGHT RENAL MASS: ICD-10-CM

## 2019-09-09 DIAGNOSIS — N28.89 RENAL MASS, RIGHT: ICD-10-CM

## 2019-09-09 LAB
BASOPHILS # BLD AUTO: 0.02 K/UL (ref 0–0.2)
BASOPHILS NFR BLD: 0.5 % (ref 0–1.9)
DIFFERENTIAL METHOD: ABNORMAL
EOSINOPHIL # BLD AUTO: 0.1 K/UL (ref 0–0.5)
EOSINOPHIL NFR BLD: 2.2 % (ref 0–8)
ERYTHROCYTE [DISTWIDTH] IN BLOOD BY AUTOMATED COUNT: 14.5 % (ref 11.5–14.5)
HCT VFR BLD AUTO: 35.9 % (ref 37–48.5)
HGB BLD-MCNC: 11.5 G/DL (ref 12–16)
IMM GRANULOCYTES # BLD AUTO: 0.02 K/UL (ref 0–0.04)
IMM GRANULOCYTES NFR BLD AUTO: 0.5 % (ref 0–0.5)
INR PPP: 1 (ref 0.8–1.2)
LYMPHOCYTES # BLD AUTO: 1.1 K/UL (ref 1–4.8)
LYMPHOCYTES NFR BLD: 26.5 % (ref 18–48)
MCH RBC QN AUTO: 32.1 PG (ref 27–31)
MCHC RBC AUTO-ENTMCNC: 32 G/DL (ref 32–36)
MCV RBC AUTO: 100 FL (ref 82–98)
MONOCYTES # BLD AUTO: 0.4 K/UL (ref 0.3–1)
MONOCYTES NFR BLD: 10.1 % (ref 4–15)
NEUTROPHILS # BLD AUTO: 2.5 K/UL (ref 1.8–7.7)
NEUTROPHILS NFR BLD: 60.2 % (ref 38–73)
NRBC BLD-RTO: 0 /100 WBC
PLATELET # BLD AUTO: 172 K/UL (ref 150–350)
PMV BLD AUTO: 12.3 FL (ref 9.2–12.9)
POCT GLUCOSE: 163 MG/DL (ref 70–110)
POCT GLUCOSE: 91 MG/DL (ref 70–110)
PROTHROMBIN TIME: 10.5 SEC (ref 9–12.5)
RBC # BLD AUTO: 3.58 M/UL (ref 4–5.4)
WBC # BLD AUTO: 4.07 K/UL (ref 3.9–12.7)

## 2019-09-09 PROCEDURE — 71000039 HC RECOVERY, EACH ADD'L HOUR

## 2019-09-09 PROCEDURE — 85025 COMPLETE CBC W/AUTO DIFF WBC: CPT

## 2019-09-09 PROCEDURE — 82962 GLUCOSE BLOOD TEST: CPT

## 2019-09-09 PROCEDURE — 25000003 PHARM REV CODE 250: Performed by: STUDENT IN AN ORGANIZED HEALTH CARE EDUCATION/TRAINING PROGRAM

## 2019-09-09 PROCEDURE — D9220A PRA ANESTHESIA: ICD-10-PCS | Mod: CRNA,,, | Performed by: NURSE ANESTHETIST, CERTIFIED REGISTERED

## 2019-09-09 PROCEDURE — 63600175 PHARM REV CODE 636 W HCPCS: Performed by: FAMILY MEDICINE

## 2019-09-09 PROCEDURE — D9220A PRA ANESTHESIA: Mod: ANES,,, | Performed by: ANESTHESIOLOGY

## 2019-09-09 PROCEDURE — D9220A PRA ANESTHESIA: Mod: CRNA,,, | Performed by: NURSE ANESTHETIST, CERTIFIED REGISTERED

## 2019-09-09 PROCEDURE — 71000033 HC RECOVERY, INTIAL HOUR

## 2019-09-09 PROCEDURE — 25000003 PHARM REV CODE 250: Performed by: NURSE ANESTHETIST, CERTIFIED REGISTERED

## 2019-09-09 PROCEDURE — 94761 N-INVAS EAR/PLS OXIMETRY MLT: CPT

## 2019-09-09 PROCEDURE — 37000008 HC ANESTHESIA 1ST 15 MINUTES

## 2019-09-09 PROCEDURE — 37000009 HC ANESTHESIA EA ADD 15 MINS

## 2019-09-09 PROCEDURE — 63600175 PHARM REV CODE 636 W HCPCS: Performed by: NURSE ANESTHETIST, CERTIFIED REGISTERED

## 2019-09-09 PROCEDURE — D9220A PRA ANESTHESIA: ICD-10-PCS | Mod: ANES,,, | Performed by: ANESTHESIOLOGY

## 2019-09-09 PROCEDURE — 85610 PROTHROMBIN TIME: CPT

## 2019-09-09 RX ORDER — CIPROFLOXACIN 500 MG/1
500 TABLET ORAL 2 TIMES DAILY
Qty: 14 TABLET | Refills: 0 | Status: SHIPPED | OUTPATIENT
Start: 2019-09-09 | End: 2019-09-16

## 2019-09-09 RX ORDER — OXYCODONE HYDROCHLORIDE 5 MG/1
5 TABLET ORAL EVERY 6 HOURS PRN
Status: DISCONTINUED | OUTPATIENT
Start: 2019-09-09 | End: 2019-09-09 | Stop reason: HOSPADM

## 2019-09-09 RX ORDER — ONDANSETRON 2 MG/ML
4 INJECTION INTRAMUSCULAR; INTRAVENOUS EVERY 6 HOURS PRN
Status: DISCONTINUED | OUTPATIENT
Start: 2019-09-09 | End: 2019-09-09 | Stop reason: HOSPADM

## 2019-09-09 RX ORDER — ONDANSETRON 2 MG/ML
INJECTION INTRAMUSCULAR; INTRAVENOUS
Status: DISCONTINUED | OUTPATIENT
Start: 2019-09-09 | End: 2019-09-09

## 2019-09-09 RX ORDER — ONDANSETRON 4 MG/1
4 TABLET, ORALLY DISINTEGRATING ORAL EVERY 6 HOURS PRN
Qty: 28 TABLET | Refills: 0 | Status: SHIPPED | OUTPATIENT
Start: 2019-09-09 | End: 2019-09-09 | Stop reason: HOSPADM

## 2019-09-09 RX ORDER — HYDROMORPHONE HYDROCHLORIDE 1 MG/ML
0.2 INJECTION, SOLUTION INTRAMUSCULAR; INTRAVENOUS; SUBCUTANEOUS EVERY 5 MIN PRN
Status: DISCONTINUED | OUTPATIENT
Start: 2019-09-09 | End: 2019-09-09 | Stop reason: HOSPADM

## 2019-09-09 RX ORDER — SODIUM CHLORIDE 9 MG/ML
INJECTION, SOLUTION INTRAVENOUS CONTINUOUS
Status: DISCONTINUED | OUTPATIENT
Start: 2019-09-09 | End: 2019-09-09 | Stop reason: HOSPADM

## 2019-09-09 RX ORDER — OXYCODONE HYDROCHLORIDE 5 MG/1
5 TABLET ORAL EVERY 4 HOURS PRN
Qty: 20 TABLET | Refills: 0 | Status: SHIPPED | OUTPATIENT
Start: 2019-09-09 | End: 2019-09-09 | Stop reason: HOSPADM

## 2019-09-09 RX ORDER — OXYCODONE HYDROCHLORIDE 5 MG/1
5 TABLET ORAL EVERY 4 HOURS PRN
Qty: 20 TABLET | Refills: 0 | Status: SHIPPED | OUTPATIENT
Start: 2019-09-09 | End: 2020-03-06

## 2019-09-09 RX ORDER — PROPOFOL 10 MG/ML
VIAL (ML) INTRAVENOUS
Status: DISCONTINUED | OUTPATIENT
Start: 2019-09-09 | End: 2019-09-09

## 2019-09-09 RX ORDER — LIDOCAINE HCL/PF 100 MG/5ML
SYRINGE (ML) INTRAVENOUS
Status: DISCONTINUED | OUTPATIENT
Start: 2019-09-09 | End: 2019-09-09

## 2019-09-09 RX ORDER — CIPROFLOXACIN 500 MG/1
500 TABLET ORAL 2 TIMES DAILY
Qty: 14 TABLET | Refills: 0 | Status: SHIPPED | OUTPATIENT
Start: 2019-09-09 | End: 2019-09-09 | Stop reason: HOSPADM

## 2019-09-09 RX ORDER — LORAZEPAM 2 MG/ML
0.25 INJECTION INTRAMUSCULAR ONCE AS NEEDED
Status: DISCONTINUED | OUTPATIENT
Start: 2019-09-09 | End: 2019-09-09 | Stop reason: HOSPADM

## 2019-09-09 RX ORDER — PHENYLEPHRINE HYDROCHLORIDE 10 MG/ML
INJECTION INTRAVENOUS
Status: DISCONTINUED | OUTPATIENT
Start: 2019-09-09 | End: 2019-09-09

## 2019-09-09 RX ORDER — ONDANSETRON 4 MG/1
4 TABLET, ORALLY DISINTEGRATING ORAL EVERY 6 HOURS PRN
Qty: 28 TABLET | Refills: 0 | Status: SHIPPED | OUTPATIENT
Start: 2019-09-09 | End: 2020-03-06

## 2019-09-09 RX ORDER — ROCURONIUM BROMIDE 10 MG/ML
INJECTION, SOLUTION INTRAVENOUS
Status: DISCONTINUED | OUTPATIENT
Start: 2019-09-09 | End: 2019-09-09

## 2019-09-09 RX ORDER — SODIUM CHLORIDE 9 MG/ML
500 INJECTION, SOLUTION INTRAVENOUS ONCE
Status: COMPLETED | OUTPATIENT
Start: 2019-09-09 | End: 2019-09-09

## 2019-09-09 RX ORDER — VASOPRESSIN 20 [USP'U]/ML
INJECTION, SOLUTION INTRAMUSCULAR; SUBCUTANEOUS
Status: DISCONTINUED | OUTPATIENT
Start: 2019-09-09 | End: 2019-09-09

## 2019-09-09 RX ORDER — MEPERIDINE HYDROCHLORIDE 50 MG/ML
12.5 INJECTION INTRAMUSCULAR; INTRAVENOUS; SUBCUTANEOUS ONCE AS NEEDED
Status: DISCONTINUED | OUTPATIENT
Start: 2019-09-09 | End: 2019-09-09 | Stop reason: HOSPADM

## 2019-09-09 RX ORDER — FENTANYL CITRATE 50 UG/ML
INJECTION, SOLUTION INTRAMUSCULAR; INTRAVENOUS
Status: DISCONTINUED | OUTPATIENT
Start: 2019-09-09 | End: 2019-09-09

## 2019-09-09 RX ADMIN — SODIUM CHLORIDE: 0.9 INJECTION, SOLUTION INTRAVENOUS at 08:09

## 2019-09-09 RX ADMIN — FENTANYL CITRATE 50 MCG: 50 INJECTION, SOLUTION INTRAMUSCULAR; INTRAVENOUS at 07:09

## 2019-09-09 RX ADMIN — VASOPRESSIN 2 UNITS: 20 INJECTION INTRAVENOUS at 09:09

## 2019-09-09 RX ADMIN — ROCURONIUM BROMIDE 10 MG: 10 INJECTION, SOLUTION INTRAVENOUS at 09:09

## 2019-09-09 RX ADMIN — VASOPRESSIN 1 UNITS: 20 INJECTION INTRAVENOUS at 08:09

## 2019-09-09 RX ADMIN — SUGAMMADEX 200 MG: 100 INJECTION, SOLUTION INTRAVENOUS at 10:09

## 2019-09-09 RX ADMIN — PROPOFOL 100 MG: 10 INJECTION, EMULSION INTRAVENOUS at 07:09

## 2019-09-09 RX ADMIN — ROCURONIUM BROMIDE 10 MG: 10 INJECTION, SOLUTION INTRAVENOUS at 08:09

## 2019-09-09 RX ADMIN — VASOPRESSIN 2 UNITS: 20 INJECTION INTRAVENOUS at 08:09

## 2019-09-09 RX ADMIN — PHENYLEPHRINE HYDROCHLORIDE 100 MCG: 10 INJECTION INTRAVENOUS at 08:09

## 2019-09-09 RX ADMIN — SODIUM CHLORIDE: 0.9 INJECTION, SOLUTION INTRAVENOUS at 07:09

## 2019-09-09 RX ADMIN — ONDANSETRON 4 MG: 2 INJECTION INTRAMUSCULAR; INTRAVENOUS at 07:09

## 2019-09-09 RX ADMIN — LIDOCAINE HYDROCHLORIDE 100 MG: 20 INJECTION, SOLUTION INTRAVENOUS at 07:09

## 2019-09-09 RX ADMIN — ROCURONIUM BROMIDE 50 MG: 10 INJECTION, SOLUTION INTRAVENOUS at 07:09

## 2019-09-09 RX ADMIN — OXYCODONE HYDROCHLORIDE 5 MG: 5 TABLET ORAL at 12:09

## 2019-09-09 NOTE — TRANSFER OF CARE
"Anesthesia Transfer of Care Note    Patient: Sakshi Marcial    Procedure(s) Performed: Procedure(s) (LRB):  CRYOABLATION, LESION, KIDNEY (N/A)    Patient location: PACU    Anesthesia Type: general    Transport from OR: Transported from OR on 6-10 L/min O2 by face mask with adequate spontaneous ventilation    Post pain: adequate analgesia    Post assessment: no apparent anesthetic complications and tolerated procedure well    Post vital signs: stable    Level of consciousness: awake, alert and oriented    Nausea/Vomiting: no nausea/vomiting    Complications: none    Transfer of care protocol was followed      Last vitals:   Visit Vitals  BP (!) 149/65 (BP Location: Left arm, Patient Position: Lying)   Pulse 71   Temp 36 °C (96.8 °F) (Temporal)   Resp 16   Ht 5' 2" (1.575 m)   Wt 82.1 kg (181 lb)   SpO2 96%   Breastfeeding? No   BMI 33.11 kg/m²     "

## 2019-09-09 NOTE — NURSING
Pt arrived to Interventional Radiology room 170 CT via stretcher for  cryoablation of R renal mass.  Name verified using two identifiers.  Allergies verified. CRNA at bedside. CRNA will be primary caregiver for patient. See CRNA notes for details of patient care.  Will continue to monitor.

## 2019-09-09 NOTE — PLAN OF CARE
CBC , PT, &INR collected by this writer, blood tubes filled all the way to the top and hand delivered to lab, awaiting results.

## 2019-09-09 NOTE — NURSING
Cryoablation of R renal mass complete. Pt tolerated well. VSS. No signs or symptoms of distress noted.  Patient to be transferred to PACU with CRNA at bedside. Ptient to receive chest Xray in hour per MD order.

## 2019-09-09 NOTE — ANESTHESIA PREPROCEDURE EVALUATION
09/09/2019  Sakshi Marcial is a 86 y.o., female.    Anesthesia Evaluation    I have reviewed the Patient Summary Reports.    I have reviewed the Nursing Notes.      Review of Systems  Anesthesia Hx:  No problems with previous Anesthesia    Hematology/Oncology:  Hematology Normal   Oncology Normal     EENT/Dental:EENT/Dental Normal   Cardiovascular:   Hypertension    Pulmonary:  Pulmonary Normal    Renal/:   Chronic Renal Disease    Hepatic/GI:  Hepatic/GI Normal    Musculoskeletal:  Musculoskeletal Normal    Neurological:  Neurology Normal    Endocrine:   Diabetes    Dermatological:  Skin Normal    Psych:  Psychiatric Normal           Physical Exam  General:  Obesity    Airway/Jaw/Neck:  Airway Findings: Mouth Opening: Normal Tongue: Normal  General Airway Assessment: Adult  Mallampati: II  TM Distance: Normal, at least 6 cm        Eyes/Ears/Nose:  EYES/EARS/NOSE FINDINGS: Normal   Dental:  Dental Findings: Edentulous   Chest/Lungs:  Chest/Lungs Clear    Heart/Vascular:  Heart Findings: Normal Heart murmur: negative Vascular Findings: Normal    Abdomen:  Abdomen Findings: Normal    Musculoskeletal:  Musculoskeletal Findings: Normal   Skin:  Skin Findings: Normal    Mental Status:  Mental Status Findings: Normal        Anesthesia Plan  Type of Anesthesia, risks & benefits discussed:  Anesthesia Type:  general  Patient's Preference:   Intra-op Monitoring Plan:   Intra-op Monitoring Plan Comments:   Post Op Pain Control Plan:   Post Op Pain Control Plan Comments:   Induction:   IV  Beta Blocker:  Patient is not currently on a Beta-Blocker (No further documentation required).       Informed Consent: Patient understands risks and agrees with Anesthesia plan.  Questions answered. Anesthesia consent signed with patient.  ASA Score: 3     Day of Surgery Review of History & Physical:    H&P update referred to the  surgeon.         Ready For Surgery From Anesthesia Perspective.

## 2019-09-09 NOTE — H&P
Radiology History & Physical      SUBJECTIVE:     Chief Complaint: R renal mass    History of Present Illness:  Sakshi Marcial is an 85 yo F with R renal mass.  This has slowly increased in size from 1.3x1.2x1.0 cm in 2017, now measuring 3.2x3.0x2.7 cm and is consistent with renal cell carcinoma. She presents today for cryoablation.    Past Medical History:   Diagnosis Date    Hyperlipidemia     Hypertension     Type 2 diabetes mellitus with chronic kidney disease      Past Surgical History:   Procedure Laterality Date    CYSTOSCOPY WITH STENT PLACEMENT Right 12/16/2016    [facility]    EXTRACTION-STONE-URETEROSCOPY Right 1/16/2017    Per[facility]    HYSTERECTOMY      LITHOTRIPSY-LASER Right 1/16/2017    Per[facility]    REMOVAL-STENT Right 1/16/2017    Performed by [facility] at [facility]    REPLACEMENT-STENT Right 1/16/2017    Performed by [facility] at [facility] [place]       Home Meds:   Prior to Admission medications    Medication Sig Start Date End Date Taking? Authorizing Provider   amLODIPine (NORVASC) 10 MG tablet Take 10 mg by mouth once daily.    Yes Historical Provider, MD   aspirin 81 MG Chew Take 81 mg by mouth once daily.   Yes Historical Provider, MD   atorvastatin (LIPITOR) 40 MG tablet Take 40 mg by mouth once daily.   Yes Historical Provider, MD   losartan (COZAAR) 100 MG tablet Take 1 tablet (100 mg total) by mouth once daily. 8/16/17 9/6/19 Yes Carmel Sanchez MD   polyethylene glycol (GLYCOLAX) 17 gram PwPk Take by mouth.   Yes Historical Provider, MD     Anticoagulants/Antiplatelets: aspirin    Allergies:   Review of patient's allergies indicates:   Allergen Reactions    Iodine Swelling     Pt reports chin and neck swelling the day after receiving medication.     Sedation History:  no adverse reactions    Review of  Systems:   Hematological: no known coagulopathies  Respiratory: no shortness of breath  Cardiovascular: no chest pain  Gastrointestinal: no abdominal pain  Genito-Urinary: no dysuria  Musculoskeletal: negative  Neurological: no TIA or stroke symptoms         OBJECTIVE:     Vital Signs (Most Recent)  Temp: 98.2 °F (36.8 °C) (09/09/19 0530)  Pulse: 78 (09/09/19 0530)  Resp: 17 (09/09/19 0530)  BP: (!) 153/69 (09/09/19 0530)  SpO2: (!) 94 % (09/09/19 0530)    Physical Exam:  ASA: 3  Mallampati: per anesthesia    General: no acute distress  Mental Status: alert and oriented to person, place and time  HEENT: normocephalic, atraumatic  Chest: unlabored breathing  Heart: regular heart rate  Abdomen: nondistended  Extremity: moves all extremities    Laboratory  Lab Results   Component Value Date    INR 1.0 09/09/2019       Lab Results   Component Value Date    WBC 4.07 09/09/2019    HGB 11.5 (L) 09/09/2019    HCT 35.9 (L) 09/09/2019     (H) 09/09/2019     09/09/2019      Lab Results   Component Value Date     (H) 04/03/2019     04/03/2019    K 4.3 04/03/2019     04/03/2019    CO2 27 04/03/2019    BUN 22 04/03/2019    CREATININE 1.5 (H) 04/03/2019    CALCIUM 10.6 (H) 04/03/2019    MG 2.0 05/01/2017    ALT 18 12/13/2018    AST 18 12/13/2018    ALBUMIN 3.9 04/03/2019    BILITOT 0.4 12/13/2018    BILIDIR 0.2 12/13/2018       ASSESSMENT/PLAN:     Sedation Plan: per anesthesia  Patient will undergo cryoablation of R renal mass.    Jhon Verma MD PGY-II  Interventional Radiology  [facility]

## 2019-09-09 NOTE — DISCHARGE SUMMARY
Radiology Discharge Summary      Hospital Course: No complications    Admit Date: 9/9/2019  Discharge Date: 09/09/2019     Instructions Given to Patient: Yes  Diet: Resume prior diet  Activity: activity as tolerated    Description of Condition on Discharge: Stable  Vital Signs (Most Recent): Temp: 96.8 °F (36 °C) (09/09/19 1031)  Pulse: 70 (09/09/19 1045)  Resp: 18 (09/09/19 1045)  BP: 138/63 (09/09/19 1045)  SpO2: 97 % (09/09/19 1045)    Discharge Disposition: Home    Discharge Diagnosis: RCC     Follow-up: per urology    Giovanny Kraft MD  Radiology PGY-3  [phone]

## 2019-09-09 NOTE — PLAN OF CARE
Patient assigned to this writer by charge nurse. The patient is in the waiting room but, will be escorted to [facility] room 3.This writer is completing a chart review and reviewing MD orders.

## 2019-09-09 NOTE — ANESTHESIA POSTPROCEDURE EVALUATION
Anesthesia Post Evaluation    Patient: Sakshi Marcial    Procedure(s) Performed: Procedure(s) (LRB):  CRYOABLATION, LESION, KIDNEY (N/A)    Final Anesthesia Type: general  Patient location during evaluation: PACU  Patient participation: Yes- Able to Participate  Level of consciousness: awake and alert  Post-procedure vital signs: reviewed and stable  Pain management: adequate  Airway patency: patent  PONV status at discharge: No PONV  Anesthetic complications: no      Cardiovascular status: blood pressure returned to baseline  Respiratory status: spontaneous ventilation and room air  Hydration status: euvolemic  Follow-up not needed.          Vitals Value Taken Time   /61 9/9/2019 12:17 PM   Temp 36 °C (96.8 °F) 9/9/2019 10:31 AM   Pulse 64 9/9/2019 12:23 PM   Resp 16 9/9/2019 12:23 PM   SpO2 94 % 9/9/2019 12:23 PM   Vitals shown include unvalidated device data.      No case tracking events are documented in the log.      Pain/Koby Score: Pain Rating Prior to Med Admin: 4 (9/9/2019 12:01 PM)  Koby Score: 10 (9/9/2019 11:30 AM)

## 2019-09-09 NOTE — PLAN OF CARE
The patient was  escorted to [facility] room 3 by PCT. The patient is currently  changing into a hospital gown.

## 2019-09-09 NOTE — PROCEDURES
Radiology Post-Procedure Note    Pre Op Diagnosis: RCC  Post Op Diagnosis: Same    Procedure: cryoablation    Procedure performed by: Madhu Haider MD    Written Informed Consent Obtained: Yes  Specimen Removed: NO  Estimated Blood Loss: Minimal    Findings:   Successful cryoablation of R renal RCC    Patient tolerated procedure well.    @SIG@

## 2019-09-10 DIAGNOSIS — N28.89 RENAL MASS, RIGHT: Primary | ICD-10-CM

## 2019-09-20 ENCOUNTER — TELEPHONE (OUTPATIENT)
Dept: INTERVENTIONAL RADIOLOGY/VASCULAR | Facility: CLINIC | Age: 84
End: 2019-09-20

## 2019-09-25 ENCOUNTER — TELEPHONE (OUTPATIENT)
Dept: INTERVENTIONAL RADIOLOGY/VASCULAR | Facility: CLINIC | Age: 84
End: 2019-09-25

## 2019-09-25 NOTE — TELEPHONE ENCOUNTER
Left message for pt to return my call. Need to schedule IR follow up. Please forward call to [phone]. Thanks

## 2019-10-17 ENCOUNTER — HOSPITAL ENCOUNTER (OUTPATIENT)
Dept: RADIOLOGY | Facility: HOSPITAL | Age: 84
Discharge: HOME | End: 2019-10-17
Attending: FAMILY MEDICINE
Payer: MEDICARE

## 2019-10-17 ENCOUNTER — OFFICE VISIT (OUTPATIENT)
Dept: INTERVENTIONAL RADIOLOGY/VASCULAR | Facility: CLINIC | Age: 84
End: 2019-10-17
Attending: FAMILY MEDICINE
Payer: MEDICARE

## 2019-10-17 VITALS
HEART RATE: 67 BPM | BODY MASS INDEX: 33.43 KG/M2 | DIASTOLIC BLOOD PRESSURE: 68 MMHG | SYSTOLIC BLOOD PRESSURE: 155 MMHG | HEIGHT: 62 IN | WEIGHT: 181.69 LBS

## 2019-10-17 DIAGNOSIS — N28.89 RIGHT RENAL MASS: Primary | ICD-10-CM

## 2019-10-17 DIAGNOSIS — N28.89 RENAL MASS, RIGHT: ICD-10-CM

## 2019-10-17 PROCEDURE — 99999 PR PBB SHADOW E&M-EST. PATIENT-LVL II: CPT | Mod: PBBFAC,,,

## 2019-10-17 PROCEDURE — 99213 OFFICE O/P EST LOW 20 MIN: CPT | Mod: S$GLB,,, | Performed by: RADIOLOGY

## 2019-10-17 PROCEDURE — 74183 MRI ABDOMEN W WO CONTRAST: ICD-10-PCS | Mod: 26,,, | Performed by: RADIOLOGY

## 2019-10-17 PROCEDURE — A9585 GADOBUTROL INJECTION: HCPCS | Performed by: FAMILY MEDICINE

## 2019-10-17 PROCEDURE — 25500020 PHARM REV CODE 255: Performed by: FAMILY MEDICINE

## 2019-10-17 PROCEDURE — 99213 PR OFFICE/OUTPT VISIT, EST, LEVL III, 20-29 MIN: ICD-10-PCS | Mod: S$GLB,,, | Performed by: RADIOLOGY

## 2019-10-17 PROCEDURE — 1101F PR PT FALLS ASSESS DOC 0-1 FALLS W/OUT INJ PAST YR: ICD-10-PCS | Mod: CPTII,S$GLB,, | Performed by: RADIOLOGY

## 2019-10-17 PROCEDURE — 74183 MRI ABD W/O CNTR FLWD CNTR: CPT | Mod: TC

## 2019-10-17 PROCEDURE — 99999 PR PBB SHADOW E&M-EST. PATIENT-LVL II: ICD-10-PCS | Mod: PBBFAC,,,

## 2019-10-17 PROCEDURE — 74183 MRI ABD W/O CNTR FLWD CNTR: CPT | Mod: 26,,, | Performed by: RADIOLOGY

## 2019-10-17 PROCEDURE — 1101F PT FALLS ASSESS-DOCD LE1/YR: CPT | Mod: CPTII,S$GLB,, | Performed by: RADIOLOGY

## 2019-10-17 RX ORDER — GADOBUTROL 604.72 MG/ML
10 INJECTION INTRAVENOUS
Status: COMPLETED | OUTPATIENT
Start: 2019-10-17 | End: 2019-10-17

## 2019-10-17 RX ADMIN — GADOBUTROL 10 ML: 604.72 INJECTION INTRAVENOUS at 10:10

## 2019-10-17 NOTE — PROGRESS NOTES
Interventional Radiology Progress Note        SUBJECTIVE:     Chief Complaint: Follow up after cryoablation    History of Present Illness: Sakshi Marcial is a 86 y.o. female who presents for follow up of cryoablation. Pt tolerated procedure well. Only c/o mild soreness in the back for which she does not require pain medication. No other complaints. No change in bowel habits or urine habits.    Past Medical History:   Diagnosis Date    Hyperlipidemia     Hypertension     Type 2 diabetes mellitus with chronic kidney disease      Past Surgical History:   Procedure Laterality Date    CRYOABLATION OF LESION OF KIDNEY N/A 9/9/2019    Procedure: CRYOABLATION, LESION, KIDNEY;  Surgeon: Elvie Surgeon;  Location: [facility];  Service: Anesthesiology;  Laterality: N/A;  Room 173/[place]    HYSTERECTOMY       History reviewed. No pertinent family history.  Social History     Tobacco Use    Smoking status: Never Smoker    Smokeless tobacco: Former User   Substance Use Topics    Alcohol use: No    Drug use: No       Review of patient's allergies indicates:   Allergen Reactions    Iodine Swelling     Pt reports chin and neck swelling the day after receiving medication.        Review of Systems:  Review of Systems   Constitutional: Negative for activity change, appetite change and fever.   HENT: Negative for sore throat.    Respiratory: Negative for chest tightness and shortness of breath.    Cardiovascular: Negative for chest pain.   Gastrointestinal: Negative for abdominal distention, abdominal pain, constipation, diarrhea, nausea and vomiting.   Genitourinary: Negative for dysuria.   Musculoskeletal: Positive for back pain. Negative for neck pain.   Skin: Negative for color change.   Allergic/Immunologic: Negative for immunocompromised state.   Neurological: Negative for dizziness and syncope.   Hematological: Does not bruise/bleed easily.   Psychiatric/Behavioral: Negative for confusion and suicidal ideas. The patient  "is not nervous/anxious.         OBJECTIVE:     Vital Signs Range:  BP (!) 155/68 (BP Location: Right arm, Patient Position: Sitting)   Pulse 67   Ht 5' 2" (1.575 m)   Wt 82.4 kg (181 lb 10.5 oz)   BMI 33.23 kg/m²       Physical Exam:  Physical Exam   Constitutional: She is oriented to person, place, and time. She appears well-developed.   HENT:   Head: Normocephalic and atraumatic.   Eyes: Conjunctivae and EOM are normal.   Cardiovascular: Normal rate and intact distal pulses.   Pulmonary/Chest: Effort normal.   Abdominal: Soft. Bowel sounds are normal.   Musculoskeletal: Normal range of motion.   Neurological: She is alert and oriented to person, place, and time.   Psychiatric: She has a normal mood and affect. Her behavior is normal. Judgment and thought content normal.       Body mass index is 33.23 kg/m².       ECO  Anticoagulation: None      Laboratory:  Lab Results   Component Value Date    INR 1.0 2019       Lab Results   Component Value Date    WBC 4.07 2019    HGB 11.5 (L) 2019    HCT 35.9 (L) 2019     (H) 2019     2019      Lab Results   Component Value Date     (H) 2019     2019    K 4.3 2019     2019    CO2 27 2019    BUN 22 2019    CREATININE 1.5 (H) 2019    CALCIUM 10.6 (H) 2019    MG 2.0 2017    ALT 18 2018    AST 18 2018    ALBUMIN 3.9 2019    BILITOT 0.4 2018    BILIDIR 0.2 2018       Diagnostic Results:  MRI: Reviewed  MRI shows good ablation cavity without evidence for residual or recurrence.          ASSESSMENT/PLAN:     87 yo female with right renal mass s/p cryoablation.  - Patient doing well and MRI does not show residual or recurrence. Will follow up with MRI in 3 months.    Rafi Flores M.D.  Diagnostic and Interventional Radiologist  Department of Radiology  Pager: [phone]    "

## 2019-10-18 LAB
CREAT SERPL-MCNC: 1.2 MG/DL (ref 0.5–1.4)
SAMPLE: NORMAL

## 2020-03-06 ENCOUNTER — OFFICE VISIT (OUTPATIENT)
Dept: UROLOGY | Facility: CLINIC | Age: 85
End: 2020-03-06
Payer: MEDICARE

## 2020-03-06 VITALS — DIASTOLIC BLOOD PRESSURE: 65 MMHG | SYSTOLIC BLOOD PRESSURE: 126 MMHG | HEART RATE: 69 BPM

## 2020-03-06 DIAGNOSIS — N28.89 RENAL MASS, RIGHT: Primary | ICD-10-CM

## 2020-03-06 DIAGNOSIS — N18.30 CKD (CHRONIC KIDNEY DISEASE) STAGE 3, GFR 30-59 ML/MIN: ICD-10-CM

## 2020-03-06 PROCEDURE — 1159F PR MEDICATION LIST DOCUMENTED IN MEDICAL RECORD: ICD-10-PCS | Mod: S$GLB,,, | Performed by: UROLOGY

## 2020-03-06 PROCEDURE — 1101F PT FALLS ASSESS-DOCD LE1/YR: CPT | Mod: CPTII,S$GLB,, | Performed by: UROLOGY

## 2020-03-06 PROCEDURE — 1101F PR PT FALLS ASSESS DOC 0-1 FALLS W/OUT INJ PAST YR: ICD-10-PCS | Mod: CPTII,S$GLB,, | Performed by: UROLOGY

## 2020-03-06 PROCEDURE — 1159F MED LIST DOCD IN RCRD: CPT | Mod: S$GLB,,, | Performed by: UROLOGY

## 2020-03-06 PROCEDURE — 99214 OFFICE O/P EST MOD 30 MIN: CPT | Mod: S$GLB,,, | Performed by: UROLOGY

## 2020-03-06 PROCEDURE — 99214 PR OFFICE/OUTPT VISIT, EST, LEVL IV, 30-39 MIN: ICD-10-PCS | Mod: S$GLB,,, | Performed by: UROLOGY

## 2020-03-06 RX ORDER — LOSARTAN POTASSIUM 100 MG/1
TABLET ORAL
COMMUNITY
Start: 2020-02-04 | End: 2022-07-01

## 2020-03-06 NOTE — PROGRESS NOTES
Subjective:      Sakshi Marcial is a 87 y.o. female who returns today regarding her     Status post ablation of right renal mass by interventional Radiology.  Recent MR showed no evidence of local recurrence.  She is scheduled for another MRI in 2 weeks and then follow up with interventional Radiology.    Doing great  No hematuria  No flank pain    The following portions of the patient's history were reviewed and updated as appropriate: allergies, current medications, past family history, past medical history, past social history, past surgical history and problem list.    Review of Systems  Pertinent items are noted in HPI.  A comprehensive multipoint review of systems was negative except as otherwise stated in the HPI.     Objective:   Vitals: /65 (BP Location: Left arm, Patient Position: Sitting, BP Method: X-Large (Automatic))   Pulse 69     Physical Exam   General: alert and oriented, no acute distress  Respiratory: Symmetric expansion, non-labored breathing  Cardiovascular: normal to inspection  Abdomen: non distended   Skin: normal coloration and turgor, no rashes, no suspicious skin lesions noted  Neuro: no gross deficits  Psych: normal judgment and insight, normal mood/affect and non-anxious  No cva tenderness    Physical Exam    Lab Review   Urinalysis demonstrates no specimen    Lab Results   Component Value Date    WBC 4.07 09/09/2019    HGB 11.5 (L) 09/09/2019    HCT 35.9 (L) 09/09/2019     (H) 09/09/2019     09/09/2019     Lab Results   Component Value Date    CREATININE 1.5 (H) 04/03/2019    BUN 22 04/03/2019     No pathology    Imaging  Impression       Status post ablation right renal mass with no evidence to suggest local recurrence.    Bilateral renal simple cysts.      Electronically signed by: Kobe Acosta MD  Date: 10/17/2019  Time: 10:41       Assessment and Plan:   Renal mass, right sp ablation with IR    CKD (chronic kidney disease) stage 3, GFR 30-59 ml/min    MR  in 2 weeks then see IR  RTC 6 months

## 2020-03-16 ENCOUNTER — HOSPITAL ENCOUNTER (OUTPATIENT)
Dept: RADIOLOGY | Facility: OTHER | Age: 85
Discharge: HOME | End: 2020-03-16
Attending: RADIOLOGY
Payer: MEDICARE

## 2020-03-16 DIAGNOSIS — N28.89 RIGHT RENAL MASS: ICD-10-CM

## 2020-03-16 DIAGNOSIS — N28.89 RENAL MASS, RIGHT: Primary | ICD-10-CM

## 2020-03-16 PROCEDURE — 25500020 PHARM REV CODE 255: Performed by: RADIOLOGY

## 2020-03-16 PROCEDURE — 74183 MRI ABDOMEN W WO CONTRAST: ICD-10-PCS | Mod: 26,,, | Performed by: RADIOLOGY

## 2020-03-16 PROCEDURE — 74183 MRI ABD W/O CNTR FLWD CNTR: CPT | Mod: 26,,, | Performed by: RADIOLOGY

## 2020-03-16 PROCEDURE — 74183 MRI ABD W/O CNTR FLWD CNTR: CPT | Mod: TC

## 2020-03-16 PROCEDURE — A9585 GADOBUTROL INJECTION: HCPCS | Performed by: RADIOLOGY

## 2020-03-16 RX ORDER — GADOBUTROL 604.72 MG/ML
8 INJECTION INTRAVENOUS
Status: COMPLETED | OUTPATIENT
Start: 2020-03-16 | End: 2020-03-16

## 2020-03-16 RX ADMIN — GADOBUTROL 8 ML: 604.72 INJECTION INTRAVENOUS at 02:03

## 2020-05-22 ENCOUNTER — LAB VISIT (OUTPATIENT)
Dept: PRIMARY CARE CLINIC | Facility: CLINIC | Age: 85
End: 2020-05-22
Payer: MEDICARE

## 2020-05-22 DIAGNOSIS — R05.9 COUGH: Primary | ICD-10-CM

## 2020-05-22 PROCEDURE — U0003 INFECTIOUS AGENT DETECTION BY NUCLEIC ACID (DNA OR RNA); SEVERE ACUTE RESPIRATORY SYNDROME CORONAVIRUS 2 (SARS-COV-2) (CORONAVIRUS DISEASE [COVID-19]), AMPLIFIED PROBE TECHNIQUE, MAKING USE OF HIGH THROUGHPUT TECHNOLOGIES AS DESCRIBED BY CMS-2020-01-R: HCPCS

## 2020-05-24 LAB — SARS-COV-2 RNA RESP QL NAA+PROBE: NOT DETECTED

## 2020-06-03 NOTE — PATIENT INSTRUCTIONS
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

## 2020-08-14 DIAGNOSIS — Z85.528 HISTORY OF RENAL CELL CARCINOMA: Primary | ICD-10-CM

## 2020-08-14 DIAGNOSIS — N18.30 CHRONIC KIDNEY DISEASE (CKD), STAGE III (MODERATE): ICD-10-CM

## 2020-08-17 ENCOUNTER — HOSPITAL ENCOUNTER (OUTPATIENT)
Dept: RADIOLOGY | Facility: OTHER | Age: 85
Discharge: HOME | End: 2020-08-17
Attending: NURSE PRACTITIONER
Payer: MEDICARE

## 2020-08-17 DIAGNOSIS — Z85.528 HISTORY OF RENAL CELL CARCINOMA: ICD-10-CM

## 2020-08-17 PROCEDURE — 76770 US EXAM ABDO BACK WALL COMP: CPT | Mod: TC

## 2020-08-17 PROCEDURE — 76770 US EXAM ABDO BACK WALL COMP: CPT | Mod: 26,,, | Performed by: RADIOLOGY

## 2020-08-17 PROCEDURE — 76770 US RETROPERITONEAL COMPLETE: ICD-10-PCS | Mod: 26,,, | Performed by: RADIOLOGY

## 2020-09-08 ENCOUNTER — HOSPITAL ENCOUNTER (OUTPATIENT)
Dept: RADIOLOGY | Facility: OTHER | Age: 85
Discharge: HOME | End: 2020-09-08
Attending: NURSE PRACTITIONER
Payer: MEDICARE

## 2020-09-08 ENCOUNTER — OFFICE VISIT (OUTPATIENT)
Dept: UROLOGY | Facility: CLINIC | Age: 85
End: 2020-09-08
Payer: MEDICARE

## 2020-09-08 VITALS
HEART RATE: 71 BPM | HEIGHT: 62 IN | BODY MASS INDEX: 33.31 KG/M2 | WEIGHT: 181 LBS | SYSTOLIC BLOOD PRESSURE: 123 MMHG | DIASTOLIC BLOOD PRESSURE: 64 MMHG

## 2020-09-08 DIAGNOSIS — R10.9 FLANK PAIN: Primary | ICD-10-CM

## 2020-09-08 DIAGNOSIS — C64.2 RENAL CELL CARCINOMA OF LEFT KIDNEY: ICD-10-CM

## 2020-09-08 LAB
BILIRUB SERPL-MCNC: ABNORMAL MG/DL
BLOOD URINE, POC: ABNORMAL
CLARITY, POC UA: CLEAR
COLOR, POC UA: YELLOW
GLUCOSE UR QL STRIP: ABNORMAL
KETONES UR QL STRIP: ABNORMAL
LEUKOCYTE ESTERASE URINE, POC: ABNORMAL
NITRITE, POC UA: ABNORMAL
PH, POC UA: 5
PROTEIN, POC: ABNORMAL
SPECIFIC GRAVITY, POC UA: 1.02
UROBILINOGEN, POC UA: ABNORMAL

## 2020-09-08 PROCEDURE — 71046 X-RAY EXAM CHEST 2 VIEWS: CPT | Mod: TC,FY

## 2020-09-08 PROCEDURE — 1101F PT FALLS ASSESS-DOCD LE1/YR: CPT | Mod: CPTII,S$GLB,, | Performed by: NURSE PRACTITIONER

## 2020-09-08 PROCEDURE — 99213 OFFICE O/P EST LOW 20 MIN: CPT | Mod: 25,S$GLB,, | Performed by: NURSE PRACTITIONER

## 2020-09-08 PROCEDURE — 87086 URINE CULTURE/COLONY COUNT: CPT

## 2020-09-08 PROCEDURE — 71046 X-RAY EXAM CHEST 2 VIEWS: CPT | Mod: 26,,, | Performed by: RADIOLOGY

## 2020-09-08 PROCEDURE — 1126F PR PAIN SEVERITY QUANTIFIED, NO PAIN PRESENT: ICD-10-PCS | Mod: S$GLB,,, | Performed by: NURSE PRACTITIONER

## 2020-09-08 PROCEDURE — 99213 PR OFFICE/OUTPT VISIT, EST, LEVL III, 20-29 MIN: ICD-10-PCS | Mod: 25,S$GLB,, | Performed by: NURSE PRACTITIONER

## 2020-09-08 PROCEDURE — 81002 URINALYSIS NONAUTO W/O SCOPE: CPT | Mod: S$GLB,,, | Performed by: NURSE PRACTITIONER

## 2020-09-08 PROCEDURE — 71046 XR CHEST PA AND LATERAL: ICD-10-PCS | Mod: 26,,, | Performed by: RADIOLOGY

## 2020-09-08 PROCEDURE — 1126F AMNT PAIN NOTED NONE PRSNT: CPT | Mod: S$GLB,,, | Performed by: NURSE PRACTITIONER

## 2020-09-08 PROCEDURE — 81002 POCT URINE DIPSTICK WITHOUT MICROSCOPE: ICD-10-PCS | Mod: S$GLB,,, | Performed by: NURSE PRACTITIONER

## 2020-09-08 PROCEDURE — 1159F PR MEDICATION LIST DOCUMENTED IN MEDICAL RECORD: ICD-10-PCS | Mod: S$GLB,,, | Performed by: NURSE PRACTITIONER

## 2020-09-08 PROCEDURE — 1159F MED LIST DOCD IN RCRD: CPT | Mod: S$GLB,,, | Performed by: NURSE PRACTITIONER

## 2020-09-08 PROCEDURE — 1101F PR PT FALLS ASSESS DOC 0-1 FALLS W/OUT INJ PAST YR: ICD-10-PCS | Mod: CPTII,S$GLB,, | Performed by: NURSE PRACTITIONER

## 2020-09-08 NOTE — PROGRESS NOTES
"Subjective:      Sakshi Marcial is a 87 y.o. female who returns today regarding her RCC. She is an established patient of Dr. Cano and is new to me today.    The patient is s/p cryoablation of a right renal mass in IR on 9/19/19. MRI on 3/16/20 showed "No evidence of recurrent disease.  Treated right renal lesion with expected postprocedural change.  Recommend follow-up exam in approximately 6 months."     FABI on 8/19 showed "Solid right renal mass has decreased in size when compared to prior study dated 07/11/2019 consistent with patient's history of prior cryoablation of right renal mass.  There is a persistent right renal cyst.There appears to be renal cortical thinning bilaterally.2.1 cm left renal cyst."    Doing well! Denies dysuria, frequency and urgency. Denies gross hematuria. She reports occasional left flank pain when she is more active. Denies fever/chills. Denies a history of recurrent UTIs.     The following portions of the patient's history were reviewed and updated as appropriate: allergies, current medications, past family history, past medical history, past social history, past surgical history and problem list.    Review of Systems  Constitutional: no fever or chills  ENT: no nasal congestion or sore throat  Respiratory: no cough or shortness of breath  Cardiovascular: no chest pain or palpitations  Gastrointestinal: no nausea or vomiting, tolerating diet  Genitourinary: as per HPI  Hematologic/Lymphatic: no easy bruising or lymphadenopathy  Musculoskeletal: no arthralgias or myalgias  Neurological: no seizures or tremors  Behavioral/Psych: no auditory or visual hallucinations     Objective:   Vital Signs:   Vitals:    09/08/20 0909   BP: 123/64   Pulse: 71       Physical Exam   General: alert and oriented, no acute distress  Head: normocephalic, atraumatic  Neck: supple, no lymphadenopathy, normal ROM, no masses  Respiratory: Symmetric expansion, non-labored breathing  Cardiovascular: " "regular rate and rhythm, nomal pulses, no peripheral edema  Abdomen: soft, non tender, non distended, no palpable masses, no hernias, no hepatomegaly or splenomegaly  Pelvic: deferred  Lymphatic: no inguinal nodes  Skin: normal coloration and turgor, no rashes, no suspicious skin lesions noted  Neuro: alert and oriented x3, no gross deficits  Psych: normal judgment and insight, normal mood/affect and non-anxious  No CVA tenderness    Lab Review   Urinalysis demonstrates positive for leukocytes (++)  Lab Results   Component Value Date    WBC 4.07 09/09/2019    HGB 11.5 (L) 09/09/2019    HCT 35.9 (L) 09/09/2019     (H) 09/09/2019     09/09/2019     Lab Results   Component Value Date    CREATININE 1.3 03/16/2020    BUN 22 04/03/2019       Imaging   (all images personally reviewed; agree with report below)  FABI (8/19)- "Solid right renal mass has decreased in size when compared to prior study dated 07/11/2019 consistent with patient's history of prior cryoablation of right renal mass.  There is a persistent right renal cyst.There appears to be renal cortical thinning bilaterally.2.1 cm left renal cyst."    Assessment:   Flank pain  RCC     Plan:   Sakshi was seen today for follow-up.    Diagnoses and all orders for this visit:    Flank pain  -     POCT URINE DIPSTICK WITHOUT MICROSCOPE  -     Urine culture    Renal cell carcinoma of left kidney  -     Basic metabolic panel; Future  -     CBC auto differential; Future  -     X-Ray Chest PA And Lateral; Future    Plan:  --No evidence of recurrence/metastasis   --Labs and CXR  --Urine culture, will notify if antibiotics are needed  --Follow up with IR  --Follow up with Dr. Charles in 6 months   "

## 2020-09-09 LAB
BACTERIA UR CULT: NORMAL
BACTERIA UR CULT: NORMAL

## 2021-03-01 ENCOUNTER — TELEPHONE (OUTPATIENT)
Dept: UROLOGY | Facility: CLINIC | Age: 86
End: 2021-03-01

## 2021-03-01 DIAGNOSIS — C64.2 RENAL CELL CARCINOMA OF LEFT KIDNEY: Primary | ICD-10-CM

## 2021-03-03 ENCOUNTER — HOSPITAL ENCOUNTER (OUTPATIENT)
Dept: RADIOLOGY | Facility: OTHER | Age: 86
Discharge: HOME | End: 2021-03-03
Attending: UROLOGY
Payer: MEDICARE

## 2021-03-03 DIAGNOSIS — C64.2 RENAL CELL CARCINOMA OF LEFT KIDNEY: ICD-10-CM

## 2021-03-03 PROCEDURE — 76700 US EXAM ABDOM COMPLETE: CPT | Mod: 26,,, | Performed by: RADIOLOGY

## 2021-03-03 PROCEDURE — 71046 X-RAY EXAM CHEST 2 VIEWS: CPT | Mod: TC,FY

## 2021-03-03 PROCEDURE — 71046 X-RAY EXAM CHEST 2 VIEWS: CPT | Mod: 26,,, | Performed by: RADIOLOGY

## 2021-03-03 PROCEDURE — 76700 US EXAM ABDOM COMPLETE: CPT | Mod: TC

## 2021-03-03 PROCEDURE — 71046 XR CHEST PA AND LATERAL: ICD-10-PCS | Mod: 26,,, | Performed by: RADIOLOGY

## 2021-03-03 PROCEDURE — 76700 US ABDOMEN COMPLETE: ICD-10-PCS | Mod: 26,,, | Performed by: RADIOLOGY

## 2021-03-23 ENCOUNTER — TELEPHONE (OUTPATIENT)
Dept: UROLOGY | Facility: CLINIC | Age: 86
End: 2021-03-23

## 2021-03-26 ENCOUNTER — OFFICE VISIT (OUTPATIENT)
Dept: UROLOGY | Facility: CLINIC | Age: 86
End: 2021-03-26
Payer: MEDICARE

## 2021-03-26 VITALS
HEIGHT: 62 IN | DIASTOLIC BLOOD PRESSURE: 60 MMHG | HEART RATE: 78 BPM | WEIGHT: 181 LBS | BODY MASS INDEX: 33.31 KG/M2 | SYSTOLIC BLOOD PRESSURE: 138 MMHG

## 2021-03-26 DIAGNOSIS — N18.30 STAGE 3 CHRONIC KIDNEY DISEASE, UNSPECIFIED WHETHER STAGE 3A OR 3B CKD: ICD-10-CM

## 2021-03-26 DIAGNOSIS — C64.2 RENAL CELL CARCINOMA OF LEFT KIDNEY: Primary | ICD-10-CM

## 2021-03-26 PROCEDURE — 3288F PR FALLS RISK ASSESSMENT DOCUMENTED: ICD-10-PCS | Mod: CPTII,S$GLB,, | Performed by: UROLOGY

## 2021-03-26 PROCEDURE — 1126F PR PAIN SEVERITY QUANTIFIED, NO PAIN PRESENT: ICD-10-PCS | Mod: S$GLB,,, | Performed by: UROLOGY

## 2021-03-26 PROCEDURE — 1101F PR PT FALLS ASSESS DOC 0-1 FALLS W/OUT INJ PAST YR: ICD-10-PCS | Mod: CPTII,S$GLB,, | Performed by: UROLOGY

## 2021-03-26 PROCEDURE — 1159F MED LIST DOCD IN RCRD: CPT | Mod: S$GLB,,, | Performed by: UROLOGY

## 2021-03-26 PROCEDURE — 3288F FALL RISK ASSESSMENT DOCD: CPT | Mod: CPTII,S$GLB,, | Performed by: UROLOGY

## 2021-03-26 PROCEDURE — 1126F AMNT PAIN NOTED NONE PRSNT: CPT | Mod: S$GLB,,, | Performed by: UROLOGY

## 2021-03-26 PROCEDURE — 99214 PR OFFICE/OUTPT VISIT, EST, LEVL IV, 30-39 MIN: ICD-10-PCS | Mod: S$GLB,,, | Performed by: UROLOGY

## 2021-03-26 PROCEDURE — 1101F PT FALLS ASSESS-DOCD LE1/YR: CPT | Mod: CPTII,S$GLB,, | Performed by: UROLOGY

## 2021-03-26 PROCEDURE — 99214 OFFICE O/P EST MOD 30 MIN: CPT | Mod: S$GLB,,, | Performed by: UROLOGY

## 2021-03-26 PROCEDURE — 1159F PR MEDICATION LIST DOCUMENTED IN MEDICAL RECORD: ICD-10-PCS | Mod: S$GLB,,, | Performed by: UROLOGY

## 2021-03-29 ENCOUNTER — TELEPHONE (OUTPATIENT)
Dept: UROLOGY | Facility: CLINIC | Age: 86
End: 2021-03-29

## 2021-06-29 ENCOUNTER — TELEPHONE (OUTPATIENT)
Dept: UROLOGY | Facility: CLINIC | Age: 86
End: 2021-06-29

## 2021-07-02 ENCOUNTER — TELEPHONE (OUTPATIENT)
Dept: UROLOGY | Facility: CLINIC | Age: 86
End: 2021-07-02

## 2021-07-06 ENCOUNTER — TELEPHONE (OUTPATIENT)
Dept: UROLOGY | Facility: CLINIC | Age: 86
End: 2021-07-06

## 2021-10-08 ENCOUNTER — TELEPHONE (OUTPATIENT)
Dept: UROLOGY | Facility: CLINIC | Age: 86
End: 2021-10-08

## 2021-10-08 ENCOUNTER — HOSPITAL ENCOUNTER (OUTPATIENT)
Dept: RADIOLOGY | Facility: OTHER | Age: 86
Discharge: HOME | End: 2021-10-08
Attending: UROLOGY
Payer: MEDICARE

## 2021-10-08 DIAGNOSIS — N18.30 STAGE 3 CHRONIC KIDNEY DISEASE, UNSPECIFIED WHETHER STAGE 3A OR 3B CKD: ICD-10-CM

## 2021-10-08 DIAGNOSIS — C64.2 RENAL CELL CARCINOMA OF LEFT KIDNEY: ICD-10-CM

## 2021-10-08 PROCEDURE — 76700 US EXAM ABDOM COMPLETE: CPT | Mod: 26,,, | Performed by: INTERNAL MEDICINE

## 2021-10-08 PROCEDURE — 76700 US ABDOMEN COMPLETE: ICD-10-PCS | Mod: 26,,, | Performed by: INTERNAL MEDICINE

## 2021-10-08 PROCEDURE — 76700 US EXAM ABDOM COMPLETE: CPT | Mod: TC

## 2021-10-13 ENCOUNTER — TELEPHONE (OUTPATIENT)
Dept: UROLOGY | Facility: CLINIC | Age: 86
End: 2021-10-13

## 2021-10-14 ENCOUNTER — TELEPHONE (OUTPATIENT)
Dept: UROLOGY | Facility: CLINIC | Age: 86
End: 2021-10-14

## 2021-10-22 ENCOUNTER — OFFICE VISIT (OUTPATIENT)
Dept: UROLOGY | Facility: CLINIC | Age: 86
End: 2021-10-22
Payer: MEDICARE

## 2021-10-22 VITALS — BODY MASS INDEX: 33.1 KG/M2 | SYSTOLIC BLOOD PRESSURE: 150 MMHG | HEIGHT: 62 IN | DIASTOLIC BLOOD PRESSURE: 60 MMHG

## 2021-10-22 DIAGNOSIS — C64.1 RENAL CELL CARCINOMA OF RIGHT KIDNEY: Primary | ICD-10-CM

## 2021-10-22 PROCEDURE — 1159F PR MEDICATION LIST DOCUMENTED IN MEDICAL RECORD: ICD-10-PCS | Mod: CPTII,S$GLB,, | Performed by: NURSE PRACTITIONER

## 2021-10-22 PROCEDURE — 1159F MED LIST DOCD IN RCRD: CPT | Mod: CPTII,S$GLB,, | Performed by: NURSE PRACTITIONER

## 2021-10-22 PROCEDURE — 1126F PR PAIN SEVERITY QUANTIFIED, NO PAIN PRESENT: ICD-10-PCS | Mod: CPTII,S$GLB,, | Performed by: NURSE PRACTITIONER

## 2021-10-22 PROCEDURE — 1126F AMNT PAIN NOTED NONE PRSNT: CPT | Mod: CPTII,S$GLB,, | Performed by: NURSE PRACTITIONER

## 2021-10-22 PROCEDURE — 99213 PR OFFICE/OUTPT VISIT, EST, LEVL III, 20-29 MIN: ICD-10-PCS | Mod: S$GLB,,, | Performed by: NURSE PRACTITIONER

## 2021-10-22 PROCEDURE — 1160F PR REVIEW ALL MEDS BY PRESCRIBER/CLIN PHARMACIST DOCUMENTED: ICD-10-PCS | Mod: CPTII,S$GLB,, | Performed by: NURSE PRACTITIONER

## 2021-10-22 PROCEDURE — 99213 OFFICE O/P EST LOW 20 MIN: CPT | Mod: S$GLB,,, | Performed by: NURSE PRACTITIONER

## 2021-10-22 PROCEDURE — 3288F PR FALLS RISK ASSESSMENT DOCUMENTED: ICD-10-PCS | Mod: CPTII,S$GLB,, | Performed by: NURSE PRACTITIONER

## 2021-10-22 PROCEDURE — 1101F PT FALLS ASSESS-DOCD LE1/YR: CPT | Mod: CPTII,S$GLB,, | Performed by: NURSE PRACTITIONER

## 2021-10-22 PROCEDURE — 3288F FALL RISK ASSESSMENT DOCD: CPT | Mod: CPTII,S$GLB,, | Performed by: NURSE PRACTITIONER

## 2021-10-22 PROCEDURE — 1160F RVW MEDS BY RX/DR IN RCRD: CPT | Mod: CPTII,S$GLB,, | Performed by: NURSE PRACTITIONER

## 2021-10-22 PROCEDURE — 1101F PR PT FALLS ASSESS DOC 0-1 FALLS W/OUT INJ PAST YR: ICD-10-PCS | Mod: CPTII,S$GLB,, | Performed by: NURSE PRACTITIONER

## 2021-10-22 RX ORDER — CALCITONIN SALMON 200 [IU]/.09ML
SPRAY, METERED NASAL
COMMUNITY
Start: 2021-10-06 | End: 2022-07-01

## 2021-10-26 ENCOUNTER — TELEPHONE (OUTPATIENT)
Dept: UROLOGY | Facility: CLINIC | Age: 86
End: 2021-10-26
Payer: MEDICARE

## 2021-11-08 ENCOUNTER — HOSPITAL ENCOUNTER (OUTPATIENT)
Dept: RADIOLOGY | Facility: OTHER | Age: 86
Discharge: HOME | End: 2021-11-08
Attending: NURSE PRACTITIONER
Payer: MEDICARE

## 2021-11-08 DIAGNOSIS — C64.1 RENAL CELL CARCINOMA OF RIGHT KIDNEY: ICD-10-CM

## 2021-11-15 DIAGNOSIS — I50.30 HEART FAILURE WITH PRESERVED EJECTION FRACTION: Primary | ICD-10-CM

## 2021-11-18 ENCOUNTER — HOSPITAL ENCOUNTER (OUTPATIENT)
Dept: RADIOLOGY | Facility: HOSPITAL | Age: 86
Discharge: HOME | End: 2021-11-18
Attending: NURSE PRACTITIONER
Payer: MEDICARE

## 2021-11-18 PROCEDURE — 74183 MRI ABD W/O CNTR FLWD CNTR: CPT | Mod: TC

## 2021-11-18 PROCEDURE — 25500020 PHARM REV CODE 255: Performed by: NURSE PRACTITIONER

## 2021-11-18 PROCEDURE — 74183 MRI ABD W/O CNTR FLWD CNTR: CPT | Mod: 26,,, | Performed by: RADIOLOGY

## 2021-11-18 PROCEDURE — 74183 MRI ABDOMEN W WO CONTRAST: ICD-10-PCS | Mod: 26,,, | Performed by: RADIOLOGY

## 2021-11-18 PROCEDURE — A9585 GADOBUTROL INJECTION: HCPCS | Performed by: NURSE PRACTITIONER

## 2021-11-18 RX ORDER — GADOBUTROL 604.72 MG/ML
10 INJECTION INTRAVENOUS
Status: COMPLETED | OUTPATIENT
Start: 2021-11-18 | End: 2021-11-18

## 2021-11-18 RX ADMIN — GADOBUTROL 10 ML: 604.72 INJECTION INTRAVENOUS at 09:11

## 2021-11-19 LAB
CREAT SERPL-MCNC: 1.3 MG/DL (ref 0.5–1.4)
SAMPLE: NORMAL

## 2021-11-22 DIAGNOSIS — C64.9 RENAL CELL CARCINOMA, UNSPECIFIED LATERALITY: Primary | ICD-10-CM

## 2021-12-06 ENCOUNTER — HOSPITAL ENCOUNTER (OUTPATIENT)
Dept: RADIOLOGY | Facility: HOSPITAL | Age: 86
Discharge: HOME | End: 2021-12-06
Attending: NURSE PRACTITIONER
Payer: MEDICARE

## 2021-12-06 DIAGNOSIS — I50.30 HEART FAILURE WITH PRESERVED EJECTION FRACTION: ICD-10-CM

## 2021-12-06 PROCEDURE — 76775 US EXAM ABDO BACK WALL LIM: CPT | Mod: 26,,, | Performed by: RADIOLOGY

## 2021-12-06 PROCEDURE — 76775 US ABDOMINAL AORTA: ICD-10-PCS | Mod: 26,,, | Performed by: RADIOLOGY

## 2021-12-06 PROCEDURE — 76775 US EXAM ABDO BACK WALL LIM: CPT | Mod: TC

## 2022-04-25 ENCOUNTER — TELEPHONE (OUTPATIENT)
Dept: UROLOGY | Facility: CLINIC | Age: 87
End: 2022-04-25
Payer: MEDICARE

## 2022-06-08 ENCOUNTER — HOSPITAL ENCOUNTER (OUTPATIENT)
Dept: RADIOLOGY | Facility: OTHER | Age: 87
Discharge: HOME | End: 2022-06-08
Attending: NURSE PRACTITIONER
Payer: MEDICARE

## 2022-06-08 DIAGNOSIS — C64.9 RENAL CELL CARCINOMA, UNSPECIFIED LATERALITY: ICD-10-CM

## 2022-06-08 PROCEDURE — 76700 US EXAM ABDOM COMPLETE: CPT | Mod: 26,,, | Performed by: RADIOLOGY

## 2022-06-08 PROCEDURE — 76700 US EXAM ABDOM COMPLETE: CPT | Mod: TC

## 2022-06-08 PROCEDURE — 76700 US ABDOMEN COMPLETE: ICD-10-PCS | Mod: 26,,, | Performed by: RADIOLOGY

## 2022-06-30 NOTE — PROGRESS NOTES
"Subjective:      Sakshi Marcial is a 89 y.o. female who returns today regarding her     The patient is s/p cryoablation of a right renal mass in IR on 9/19/19. MRI on 3/16/20 showed "No evidence of recurrent disease.  Treated right renal lesion with expected postprocedural change.  Recommend follow-up exam in approximately 6 months."     FABI on 8/19 showed "Solid right renal mass has decreased in size when compared to prior study dated 07/11/2019 consistent with patient's history of prior cryoablation of right renal mass.  There is a persistent right renal cyst.There appears to be renal cortical thinning bilaterally.2.1 cm left renal cyst."     Doing well! Denies dysuria, frequency and urgency. Denies gross hematuria. She reports occasional left flank pain when she is more active. Denies fever/chills. Denies a history of recurrent UTIs..     No complaints.    The following portions of the patient's history were reviewed and updated as appropriate: allergies, current medications, past family history, past medical history, past social history, past surgical history and problem list.    Review of Systems  Pertinent items are noted in HPI.  A comprehensive multipoint review of systems was negative except as otherwise stated in the HPI.    Past Medical History:   Diagnosis Date    Hyperlipidemia     Hypertension     Type 2 diabetes mellitus with chronic kidney disease      Past Surgical History:   Procedure Laterality Date    CRYOABLATION OF LESION OF KIDNEY N/A 9/9/2019    Procedure: CRYOABLATION, LESION, KIDNEY;  Surgeon: Elvie Surgeon;  Location: [facility];  Service: Anesthesiology;  Laterality: N/A;  Room 173/Gilbert    HYSTERECTOMY         Review of patient's allergies indicates:   Allergen Reactions    Iodine Swelling     Pt reports chin and neck swelling the day after receiving medication.          Objective:   Vitals: There were no vitals taken for this visit.    Physical Exam   General: alert and " oriented, no acute distress  Respiratory: Symmetric expansion, non-labored breathing  Cardiovascular: no peripheral edema  Abdomen: soft, non distended  Skin: normal coloration and turgor, no rashes, no suspicious skin lesions noted  Neuro: no gross deficits  Psych: normal judgment and insight, normal mood/affect and non-anxious    Physical Exam    Lab Review   Urinalysis demonstrates no specimen    Lab Results   Component Value Date    WBC 4.04 09/08/2020    HGB 12.4 09/08/2020    HCT 38.5 09/08/2020    MCV 98 09/08/2020     09/08/2020     Lab Results   Component Value Date    CREATININE 1.3 10/08/2021    BUN 21 10/08/2021       Imaging  US ABDOMEN COMPLETE     CLINICAL HISTORY:  Malignant neoplasm of unspecified kidney, except renal pelvis     TECHNIQUE:  Complete abdominal ultrasound (including pancreas, aorta, liver, gallbladder, common bile duct, IVC, kidneys, and spleen) was performed.     COMPARISON:  MRI 11/18/2021     FINDINGS:  Pancreas: The visualized portions of pancreas appear normal.     Aorta: No aneurysm.     Liver: 14.6 cm, normal in size. Homogeneous parenchymal echotexture. No focal lesions.     Gallbladder: No calculi, wall thickening, or pericholecystic fluid.  Negative sonographic Millan's sign.     Biliary system: 9 mm common bile duct, slightly prominent.  No intrahepatic ductal dilatation.     Inferior vena cava: Normal in appearance.     Right kidney: 9.8 cm. No hydronephrosis.  Cortical thinning and cysts are noted.  2.2 cm exophytic nodule in the interpolar kidney in the region of the treated RCC.     Left kidney: 11.4 cm. No hydronephrosis.  Cortical thinning and cysts are noted.     Spleen: 7.8 cm.  Normal in size with homogeneous echotexture.     Miscellaneous: No ascites.     Impression:     2.2 cm exophytic lesion in the interpolar right kidney corresponds to the previously treated renal cell carcinoma.  There is no Doppler flow to suggest residual vascularity or definite  recurrent disease.  Further evaluation with contrasted MRI in 3-6 months would be beneficial to further evaluate for potential residual or recurrent disease.        Electronically signed by: Madhu Haider MD  Date:                                            06/08/2022  Time:                                           10:53          Assessment and Plan:   Renal cell carcinoma of left kidney    Stage 3 chronic kidney disease, unspecified whether stage 3a or 3b CKD      RTC 6 months with BMP and MR Ab gadolinium and CXR

## 2022-07-01 ENCOUNTER — OFFICE VISIT (OUTPATIENT)
Dept: UROLOGY | Facility: CLINIC | Age: 87
End: 2022-07-01
Payer: MEDICARE

## 2022-07-01 VITALS
DIASTOLIC BLOOD PRESSURE: 63 MMHG | WEIGHT: 181 LBS | HEART RATE: 63 BPM | SYSTOLIC BLOOD PRESSURE: 143 MMHG | HEIGHT: 62 IN | BODY MASS INDEX: 33.31 KG/M2

## 2022-07-01 DIAGNOSIS — N18.30 STAGE 3 CHRONIC KIDNEY DISEASE, UNSPECIFIED WHETHER STAGE 3A OR 3B CKD: ICD-10-CM

## 2022-07-01 DIAGNOSIS — C64.2 RENAL CELL CARCINOMA OF LEFT KIDNEY: Primary | ICD-10-CM

## 2022-07-01 PROCEDURE — 1126F PR PAIN SEVERITY QUANTIFIED, NO PAIN PRESENT: ICD-10-PCS | Mod: CPTII,S$GLB,, | Performed by: UROLOGY

## 2022-07-01 PROCEDURE — 1101F PR PT FALLS ASSESS DOC 0-1 FALLS W/OUT INJ PAST YR: ICD-10-PCS | Mod: CPTII,S$GLB,, | Performed by: UROLOGY

## 2022-07-01 PROCEDURE — 3288F PR FALLS RISK ASSESSMENT DOCUMENTED: ICD-10-PCS | Mod: CPTII,S$GLB,, | Performed by: UROLOGY

## 2022-07-01 PROCEDURE — 1101F PT FALLS ASSESS-DOCD LE1/YR: CPT | Mod: CPTII,S$GLB,, | Performed by: UROLOGY

## 2022-07-01 PROCEDURE — 99214 PR OFFICE/OUTPT VISIT, EST, LEVL IV, 30-39 MIN: ICD-10-PCS | Mod: S$GLB,,, | Performed by: UROLOGY

## 2022-07-01 PROCEDURE — 99214 OFFICE O/P EST MOD 30 MIN: CPT | Mod: S$GLB,,, | Performed by: UROLOGY

## 2022-07-01 PROCEDURE — 3288F FALL RISK ASSESSMENT DOCD: CPT | Mod: CPTII,S$GLB,, | Performed by: UROLOGY

## 2022-07-01 PROCEDURE — 1159F PR MEDICATION LIST DOCUMENTED IN MEDICAL RECORD: ICD-10-PCS | Mod: CPTII,S$GLB,, | Performed by: UROLOGY

## 2022-07-01 PROCEDURE — 1159F MED LIST DOCD IN RCRD: CPT | Mod: CPTII,S$GLB,, | Performed by: UROLOGY

## 2022-07-01 PROCEDURE — 1126F AMNT PAIN NOTED NONE PRSNT: CPT | Mod: CPTII,S$GLB,, | Performed by: UROLOGY

## 2022-07-01 RX ORDER — CINACALCET 30 MG/1
30 TABLET, FILM COATED ORAL 2 TIMES DAILY
COMMUNITY
Start: 2022-04-26

## 2022-07-01 RX ORDER — METOPROLOL SUCCINATE 25 MG/1
25 TABLET, EXTENDED RELEASE ORAL DAILY
COMMUNITY
Start: 2022-04-12

## 2022-07-06 ENCOUNTER — TELEPHONE (OUTPATIENT)
Dept: UROLOGY | Facility: CLINIC | Age: 87
End: 2022-07-06
Payer: MEDICARE

## 2022-09-21 DIAGNOSIS — I50.30 DIASTOLIC HEART FAILURE, UNSPECIFIED HF CHRONICITY: Primary | ICD-10-CM

## 2022-09-21 DIAGNOSIS — R06.02 SOB (SHORTNESS OF BREATH): ICD-10-CM

## 2022-09-26 ENCOUNTER — HOSPITAL ENCOUNTER (OUTPATIENT)
Dept: PULMONOLOGY | Facility: CLINIC | Age: 87
Discharge: HOME | End: 2022-09-26
Payer: MEDICARE

## 2022-09-26 DIAGNOSIS — R06.02 SOB (SHORTNESS OF BREATH): ICD-10-CM

## 2022-09-26 DIAGNOSIS — I50.30 DIASTOLIC HEART FAILURE, UNSPECIFIED HF CHRONICITY: ICD-10-CM

## 2022-09-26 PROCEDURE — 94727 GAS DIL/WSHOT DETER LNG VOL: CPT | Mod: S$GLB,,, | Performed by: INTERNAL MEDICINE

## 2022-09-26 PROCEDURE — 94060 EVALUATION OF WHEEZING: CPT | Mod: S$GLB,,, | Performed by: INTERNAL MEDICINE

## 2022-09-26 PROCEDURE — 94729 PR C02/MEMBANE DIFFUSE CAPACITY: ICD-10-PCS | Mod: S$GLB,,, | Performed by: INTERNAL MEDICINE

## 2022-09-26 PROCEDURE — 94727 PR PULM FUNCTION TEST BY GAS: ICD-10-PCS | Mod: S$GLB,,, | Performed by: INTERNAL MEDICINE

## 2022-09-26 PROCEDURE — 94729 DIFFUSING CAPACITY: CPT | Mod: S$GLB,,, | Performed by: INTERNAL MEDICINE

## 2022-09-26 PROCEDURE — 94060 PR EVAL OF BRONCHOSPASM: ICD-10-PCS | Mod: S$GLB,,, | Performed by: INTERNAL MEDICINE

## 2022-12-28 ENCOUNTER — LAB VISIT (OUTPATIENT)
Dept: LAB | Facility: OTHER | Age: 87
End: 2022-12-28
Attending: UROLOGY
Payer: MEDICARE

## 2022-12-28 DIAGNOSIS — C64.2 RENAL CELL CARCINOMA OF LEFT KIDNEY: ICD-10-CM

## 2022-12-28 DIAGNOSIS — N18.30 STAGE 3 CHRONIC KIDNEY DISEASE, UNSPECIFIED WHETHER STAGE 3A OR 3B CKD: ICD-10-CM

## 2022-12-28 DIAGNOSIS — F41.9 ANXIETY: Primary | ICD-10-CM

## 2022-12-28 LAB
ANION GAP SERPL CALC-SCNC: 8 MMOL/L (ref 8–16)
BUN SERPL-MCNC: 32 MG/DL (ref 8–23)
CALCIUM SERPL-MCNC: 10.4 MG/DL (ref 8.7–10.5)
CHLORIDE SERPL-SCNC: 113 MMOL/L (ref 95–110)
CO2 SERPL-SCNC: 21 MMOL/L (ref 23–29)
CREAT SERPL-MCNC: 1.6 MG/DL (ref 0.5–1.4)
EST. GFR  (NO RACE VARIABLE): 30 ML/MIN/1.73 M^2
GLUCOSE SERPL-MCNC: 99 MG/DL (ref 70–110)
POTASSIUM SERPL-SCNC: 4.7 MMOL/L (ref 3.5–5.1)
SODIUM SERPL-SCNC: 142 MMOL/L (ref 136–145)

## 2022-12-28 PROCEDURE — 80048 BASIC METABOLIC PNL TOTAL CA: CPT | Performed by: UROLOGY

## 2022-12-28 PROCEDURE — 36415 COLL VENOUS BLD VENIPUNCTURE: CPT | Performed by: UROLOGY

## 2022-12-28 RX ORDER — DIAZEPAM 5 MG/1
5 TABLET ORAL ONCE
Qty: 1 TABLET | Refills: 0 | Status: SHIPPED | OUTPATIENT
Start: 2022-12-28 | End: 2022-12-28

## 2023-01-03 ENCOUNTER — HOSPITAL ENCOUNTER (OUTPATIENT)
Dept: RADIOLOGY | Facility: HOSPITAL | Age: 88
Discharge: HOME | End: 2023-01-03
Attending: UROLOGY
Payer: MEDICARE

## 2023-01-03 DIAGNOSIS — C64.2 RENAL CELL CARCINOMA OF LEFT KIDNEY: ICD-10-CM

## 2023-01-03 DIAGNOSIS — N18.30 STAGE 3 CHRONIC KIDNEY DISEASE, UNSPECIFIED WHETHER STAGE 3A OR 3B CKD: ICD-10-CM

## 2023-01-03 PROCEDURE — 74183 MRI ABD W/O CNTR FLWD CNTR: CPT | Mod: TC

## 2023-01-03 PROCEDURE — 25500020 PHARM REV CODE 255: Performed by: UROLOGY

## 2023-01-03 PROCEDURE — 74183 MRI ABDOMEN W WO CONTRAST: ICD-10-PCS | Mod: 26,,, | Performed by: INTERNAL MEDICINE

## 2023-01-03 PROCEDURE — 71046 X-RAY EXAM CHEST 2 VIEWS: CPT | Mod: TC

## 2023-01-03 PROCEDURE — A9585 GADOBUTROL INJECTION: HCPCS | Performed by: UROLOGY

## 2023-01-03 PROCEDURE — 74183 MRI ABD W/O CNTR FLWD CNTR: CPT | Mod: 26,,, | Performed by: INTERNAL MEDICINE

## 2023-01-03 PROCEDURE — 71046 X-RAY EXAM CHEST 2 VIEWS: CPT | Mod: 26,,, | Performed by: RADIOLOGY

## 2023-01-03 PROCEDURE — 71046 XR CHEST PA AND LATERAL: ICD-10-PCS | Mod: 26,,, | Performed by: RADIOLOGY

## 2023-01-03 RX ORDER — GADOBUTROL 604.72 MG/ML
10 INJECTION INTRAVENOUS
Status: COMPLETED | OUTPATIENT
Start: 2023-01-03 | End: 2023-01-03

## 2023-01-03 RX ADMIN — GADOBUTROL 10 ML: 604.72 INJECTION INTRAVENOUS at 11:01

## 2023-01-04 LAB
CREAT SERPL-MCNC: 0.9 MG/DL (ref 0.5–1.4)
SAMPLE: NORMAL

## 2023-01-05 NOTE — PROGRESS NOTES
"Subjective:      Sakshi Marcial is a 89 y.o. female who returns today regarding her        The patient is s/p cryoablation of a right renal mass in IR on 9/19/19. MRI on 3/16/20 showed "No evidence of recurrent disease.  Treated right renal lesion with expected postprocedural change.  Recommend follow-up exam in approximately 6 months."     FABI on 8/19 showed "Solid right renal mass has decreased in size when compared to prior study dated 07/11/2019 consistent with patient's history of prior cryoablation of right renal mass.  There is a persistent right renal cyst.There appears to be renal cortical thinning bilaterally.2.1 cm left renal cyst."     Doing well! Denies dysuria, frequency and urgency. Denies gross hematuria. She reports occasional left flank pain when she is more active. Denies fever/chills. Denies a history of recurrent UTIs..     No complaints..    Doing great    No flank pain   No hematuria      The following portions of the patient's history were reviewed and updated as appropriate: allergies, current medications, past family history, past medical history, past social history, past surgical history and problem list.    Review of Systems  Pertinent items are noted in HPI.  A comprehensive multipoint review of systems was negative except as otherwise stated in the HPI.    Past Medical History:   Diagnosis Date    Hyperlipidemia     Hypertension     Type 2 diabetes mellitus with chronic kidney disease      Past Surgical History:   Procedure Laterality Date    CRYOABLATION OF LESION OF KIDNEY N/A 9/9/2019    Procedure: CRYOABLATION, LESION, KIDNEY;  Surgeon: Elvie Surgeon;  Location: [facility];  Service: Anesthesiology;  Laterality: N/A;  Room 173/Gilbert    HYSTERECTOMY         Review of patient's allergies indicates:   Allergen Reactions    Iodine Swelling     Pt reports chin and neck swelling the day after receiving medication.          Objective:   Vitals: There were no vitals taken for this " visit.    Physical Exam   General: alert and oriented, no acute distress  Appears much younger than 89!  Respiratory: Symmetric expansion, non-labored breathing  Cardiovascular: no peripheral edema  Abdomen: soft, non distended  Skin: normal coloration and turgor, no rashes, no suspicious skin lesions noted  Neuro: no gross deficits  Psych: normal judgment and insight, normal mood/affect, and non-anxious    Physical Exam    Lab Review   Urinalysis demonstrates negative for all components  Lab Results   Component Value Date    WBC 4.04 09/08/2020    HGB 12.4 09/08/2020    HCT 38.5 09/08/2020    MCV 98 09/08/2020     09/08/2020     Lab Results   Component Value Date    CREATININE 1.6 (H) 12/28/2022    BUN 32 (H) 12/28/2022       Imaging  XR CHEST PA AND LATERAL     CLINICAL HISTORY:  Malignant neoplasm of left kidney, except renal pelvis     TECHNIQUE:  PA and lateral views of the chest were performed.     COMPARISON:  Non 03/03/2021 e     FINDINGS:  Heart size normal.  The lungs are clear.  No pleural effusion     Impression:     See above        Electronically signed by: Miguel Joe MD  Date:                                            01/03/2023  Time:                                           12:12    MRI ABDOMEN W WO CONTRAST     CLINICAL HISTORY:  RCC sp ablation;  Malignant neoplasm of left kidney, except renal pelvis     TECHNIQUE:  Multiplanar multisequence images of the abdomen before and after administration of 10 mL Gadavist intravenous contrast.     COMPARISON:  Ultrasound abdomen 06/08/2022; MRI abdomen 11/18/2022.     FINDINGS:  LOWER THORAX: Unremarkable.     LIVER: No global hepatic signal abnormality. Few punctate cysts.  No suspicious focal lesion.     BILIARY: Normal gallbladder. No biliary ductal dilatation.     SPLEEN: No splenomegaly.     PANCREAS: No focal lesion.  Unchanged prominence of the main pancreatic duct up to 5 mm.     ADRENALS: No adrenal nodules.     KIDNEYS/URETERS: Post  ablation changes in the right midpole.  The ablation zone measures 3.2 x 2.3 cm and demonstrates T2 hypointense signal, peripheral T1 hyperintense signal, and no definite enhancement.  No significant changes from 11/18/2021.     Numerous renal cysts bilaterally, including a subcentimeter T1 hyperintense lesion in the left midpole suggesting hemorrhage or protein.  No definite enhancing renal mass.  No hydronephrosis.     PERITONEUM / RETROPERITONEUM: No upper abdominal free fluid.     LYMPH NODES: No upper abdominal lymphadenopathy.     VESSELS: Unchanged infrarenal aortic ectasia measuring up to 2.5 cm.  No renal vascular invasion.     GI TRACT: Diverticulosis coli.  No distention or wall thickening.     BONES AND SOFT TISSUES: No marrow replacing lesions.     Impression:     Stable post ablation changes in the right kidney.  No definite enhancing renal mass or abdominal metastases.     Electronically signed by resident: Miguel Juarez  Date:                                            01/03/2023  Time:                                           11:36     Electronically signed by: Yunior Soriano  Date:                                            01/03/2023  Time:                                           14:34    Assessment and Plan:   Renal cell carcinoma of right kidney  VIKA sp ablation      RTC 1 yr with renal US    Stage 3 chronic kidney disease, unspecified whether stage 3a or 3b CKD  Stable  BMP next year      See Dr Charles 1 yr with BMP, US next year

## 2023-01-06 ENCOUNTER — OFFICE VISIT (OUTPATIENT)
Dept: UROLOGY | Facility: CLINIC | Age: 88
End: 2023-01-06
Payer: MEDICARE

## 2023-01-06 VITALS
OXYGEN SATURATION: 100 % | WEIGHT: 180.56 LBS | SYSTOLIC BLOOD PRESSURE: 148 MMHG | BODY MASS INDEX: 33.02 KG/M2 | DIASTOLIC BLOOD PRESSURE: 67 MMHG | HEART RATE: 68 BPM

## 2023-01-06 DIAGNOSIS — N18.30 STAGE 3 CHRONIC KIDNEY DISEASE, UNSPECIFIED WHETHER STAGE 3A OR 3B CKD: ICD-10-CM

## 2023-01-06 DIAGNOSIS — C64.1 RENAL CELL CARCINOMA OF RIGHT KIDNEY: Primary | ICD-10-CM

## 2023-01-06 PROCEDURE — 3288F PR FALLS RISK ASSESSMENT DOCUMENTED: ICD-10-PCS | Mod: CPTII,S$GLB,, | Performed by: UROLOGY

## 2023-01-06 PROCEDURE — 1126F PR PAIN SEVERITY QUANTIFIED, NO PAIN PRESENT: ICD-10-PCS | Mod: CPTII,S$GLB,, | Performed by: UROLOGY

## 2023-01-06 PROCEDURE — 99214 PR OFFICE/OUTPT VISIT, EST, LEVL IV, 30-39 MIN: ICD-10-PCS | Mod: S$GLB,,, | Performed by: UROLOGY

## 2023-01-06 PROCEDURE — 1159F PR MEDICATION LIST DOCUMENTED IN MEDICAL RECORD: ICD-10-PCS | Mod: CPTII,S$GLB,, | Performed by: UROLOGY

## 2023-01-06 PROCEDURE — 3288F FALL RISK ASSESSMENT DOCD: CPT | Mod: CPTII,S$GLB,, | Performed by: UROLOGY

## 2023-01-06 PROCEDURE — 1126F AMNT PAIN NOTED NONE PRSNT: CPT | Mod: CPTII,S$GLB,, | Performed by: UROLOGY

## 2023-01-06 PROCEDURE — 1101F PR PT FALLS ASSESS DOC 0-1 FALLS W/OUT INJ PAST YR: ICD-10-PCS | Mod: CPTII,S$GLB,, | Performed by: UROLOGY

## 2023-01-06 PROCEDURE — 99214 OFFICE O/P EST MOD 30 MIN: CPT | Mod: S$GLB,,, | Performed by: UROLOGY

## 2023-01-06 PROCEDURE — 1159F MED LIST DOCD IN RCRD: CPT | Mod: CPTII,S$GLB,, | Performed by: UROLOGY

## 2023-01-06 PROCEDURE — 1101F PT FALLS ASSESS-DOCD LE1/YR: CPT | Mod: CPTII,S$GLB,, | Performed by: UROLOGY

## 2023-06-21 DIAGNOSIS — E04.1 THYROID NODULE: Primary | ICD-10-CM

## 2023-06-29 ENCOUNTER — HOSPITAL ENCOUNTER (OUTPATIENT)
Dept: RADIOLOGY | Facility: OTHER | Age: 88
Discharge: HOME | End: 2023-06-29
Attending: NURSE PRACTITIONER
Payer: MEDICARE

## 2023-06-29 DIAGNOSIS — E04.1 THYROID NODULE: ICD-10-CM

## 2023-06-29 PROCEDURE — 76536 US EXAM OF HEAD AND NECK: CPT | Mod: 26,,, | Performed by: RADIOLOGY

## 2023-06-29 PROCEDURE — 76536 US EXAM OF HEAD AND NECK: CPT | Mod: TC

## 2023-06-29 PROCEDURE — 76536 US SOFT TISSUE HEAD NECK THYROID: ICD-10-PCS | Mod: 26,,, | Performed by: RADIOLOGY

## 2023-11-30 ENCOUNTER — TELEPHONE (OUTPATIENT)
Dept: UROLOGY | Facility: CLINIC | Age: 88
End: 2023-11-30
Payer: MEDICARE

## 2023-12-01 ENCOUNTER — TELEPHONE (OUTPATIENT)
Dept: UROLOGY | Facility: CLINIC | Age: 88
End: 2023-12-01
Payer: MEDICARE

## 2023-12-06 ENCOUNTER — TELEPHONE (OUTPATIENT)
Dept: UROLOGY | Facility: CLINIC | Age: 88
End: 2023-12-06
Payer: MEDICARE

## 2023-12-06 NOTE — TELEPHONE ENCOUNTER
Called to get pt scheduled to come in to see Dr. Charles. Pt did not answer, left a detailed message.

## 2023-12-13 ENCOUNTER — OFFICE VISIT (OUTPATIENT)
Dept: PODIATRY | Facility: CLINIC | Age: 88
End: 2023-12-13
Payer: MEDICARE

## 2023-12-13 VITALS
WEIGHT: 180 LBS | HEIGHT: 62 IN | SYSTOLIC BLOOD PRESSURE: 153 MMHG | HEART RATE: 60 BPM | DIASTOLIC BLOOD PRESSURE: 70 MMHG | BODY MASS INDEX: 33.13 KG/M2

## 2023-12-13 DIAGNOSIS — L03.032 PARONYCHIA, TOE, LEFT: ICD-10-CM

## 2023-12-13 DIAGNOSIS — M79.675 TOE PAIN, LEFT: ICD-10-CM

## 2023-12-13 DIAGNOSIS — L60.0 INGROWN NAIL: Primary | ICD-10-CM

## 2023-12-13 PROCEDURE — 99999 PR PBB SHADOW E&M-EST. PATIENT-LVL III: CPT | Mod: PBBFAC,,, | Performed by: PODIATRIST

## 2023-12-13 PROCEDURE — 1159F MED LIST DOCD IN RCRD: CPT | Mod: CPTII,S$GLB,, | Performed by: PODIATRIST

## 2023-12-13 PROCEDURE — 99999 PR PBB SHADOW E&M-EST. PATIENT-LVL III: ICD-10-PCS | Mod: PBBFAC,,, | Performed by: PODIATRIST

## 2023-12-13 PROCEDURE — 1126F AMNT PAIN NOTED NONE PRSNT: CPT | Mod: CPTII,S$GLB,, | Performed by: PODIATRIST

## 2023-12-13 PROCEDURE — 99203 PR OFFICE/OUTPT VISIT, NEW, LEVL III, 30-44 MIN: ICD-10-PCS | Mod: S$GLB,,, | Performed by: PODIATRIST

## 2023-12-13 PROCEDURE — 1101F PR PT FALLS ASSESS DOC 0-1 FALLS W/OUT INJ PAST YR: ICD-10-PCS | Mod: CPTII,S$GLB,, | Performed by: PODIATRIST

## 2023-12-13 PROCEDURE — 1126F PR PAIN SEVERITY QUANTIFIED, NO PAIN PRESENT: ICD-10-PCS | Mod: CPTII,S$GLB,, | Performed by: PODIATRIST

## 2023-12-13 PROCEDURE — 99203 OFFICE O/P NEW LOW 30 MIN: CPT | Mod: S$GLB,,, | Performed by: PODIATRIST

## 2023-12-13 PROCEDURE — 1159F PR MEDICATION LIST DOCUMENTED IN MEDICAL RECORD: ICD-10-PCS | Mod: CPTII,S$GLB,, | Performed by: PODIATRIST

## 2023-12-13 PROCEDURE — 1101F PT FALLS ASSESS-DOCD LE1/YR: CPT | Mod: CPTII,S$GLB,, | Performed by: PODIATRIST

## 2023-12-13 PROCEDURE — 3288F PR FALLS RISK ASSESSMENT DOCUMENTED: ICD-10-PCS | Mod: CPTII,S$GLB,, | Performed by: PODIATRIST

## 2023-12-13 PROCEDURE — 3288F FALL RISK ASSESSMENT DOCD: CPT | Mod: CPTII,S$GLB,, | Performed by: PODIATRIST

## 2023-12-13 RX ORDER — LOSARTAN POTASSIUM 100 MG/1
100 TABLET ORAL
COMMUNITY
Start: 2023-12-11

## 2023-12-13 RX ORDER — TOBRAMYCIN 3 MG/ML
SOLUTION/ DROPS OPHTHALMIC
Qty: 5 ML | Refills: 3 | Status: SHIPPED | OUTPATIENT
Start: 2023-12-13

## 2023-12-13 RX ORDER — CETIRIZINE HYDROCHLORIDE 10 MG/1
10 TABLET ORAL
COMMUNITY
Start: 2023-06-20

## 2023-12-13 NOTE — PROGRESS NOTES
Subjective:      Patient ID: Sakshi Marcial is a 89 y.o. female.    Chief Complaint: Nail Problem (Toenail issue)    Sharp, throbbing pain left big toe/toenail.  Gradual onset, worsening over the past week or 2.  Aggravated by socks shoes pressure ambulation.  No prior medical treatment.  No self-treatment.  Denies trauma and surgery left foot.    Review of Systems   Constitutional: Negative for chills, diaphoresis, fever, malaise/fatigue and night sweats.   Cardiovascular:  Negative for claudication, cyanosis, leg swelling and syncope.   Skin:  Positive for nail changes. Negative for color change, dry skin, rash, suspicious lesions and unusual hair distribution.   Musculoskeletal:  Negative for falls, joint pain, joint swelling, muscle cramps, muscle weakness and stiffness.   Gastrointestinal:  Negative for constipation, diarrhea, nausea and vomiting.   Neurological:  Negative for brief paralysis, disturbances in coordination, focal weakness, numbness, paresthesias, sensory change and tremors.         Objective:      Physical Exam  Constitutional:       General: She is not in acute distress.     Appearance: She is well-developed. She is not diaphoretic.   Cardiovascular:      Pulses:           Popliteal pulses are 2+ on the right side and 2+ on the left side.        Dorsalis pedis pulses are 2+ on the right side and 2+ on the left side.        Posterior tibial pulses are 2+ on the right side and 2+ on the left side.      Comments: Capillary refill 3 seconds all toes/distal feet, all toes/both feet warm to touch.      Negative lymphadenopathy bilateral popliteal fossa and tarsal tunnel.      Negavie lower extremity edema bilateral.    Musculoskeletal:      Right ankle: No swelling, deformity, ecchymosis or lacerations. Normal range of motion. Normal pulse.      Right Achilles Tendon: Normal. No defects. Skinner's test negative.      Comments: Normal angle, base, station of gait. All ten toes without clubbing,  cyanosis, or signs of ischemia.  No pain to palpation bilateral lower extremities.  Range of motion, stability, muscle strength, and muscle tone normal bilateral feet and legs.    Lymphadenopathy:      Lower Body: No right inguinal adenopathy. No left inguinal adenopathy.      Comments: Negative lymphadenopathy bilateral popliteal fossa and tarsal tunnel.    Negative lymphangitic streaking bilateral feet/ankles/legs.   Skin:     General: Skin is warm and dry.      Capillary Refill: Capillary refill takes 2 to 3 seconds.      Coloration: Skin is not pale.      Findings: No abrasion, bruising, burn, ecchymosis, erythema, laceration, lesion or rash.      Nails: There is no clubbing.      Comments: Visible and palpable ingrowth of toenail medial border left hallux with pain to palpation, and focal localized erythema and edema,  without ulceration, drainage, pus, tracking, fluctuance, malodor, or cardinal signs infection.    Otherwise, Skin is normal age and health appropriate color, turgor, texture, and temperature bilateral lower extremities without ulceration, hyperpigmentation, discoloration, masses nodules or cords palpated.  No ecchymosis, erythema, edema, or cardinal signs of infection bilateral lower extremities.       Neurological:      Mental Status: She is alert and oriented to person, place, and time.      Sensory: No sensory deficit.      Motor: No tremor, atrophy or abnormal muscle tone.      Gait: Gait normal.      Comments: Negative tinel sign to percussion sural, superficial peroneal, deep peroneal, saphenous, and posterior tibial nerves right and left ankles and feet.     Psychiatric:         Behavior: Behavior is cooperative.           Assessment:       Encounter Diagnoses   Name Primary?    Ingrown nail Yes    Paronychia, toe, left     Toe pain, left          Plan:       Sakshi was seen today for nail problem.    Diagnoses and all orders for this visit:    Ingrown nail    Paronychia, toe,  left    Toe pain, left      I counseled the patient on her conditions, their implications and medical management.    Topical tobramycin drops left hallux twice daily.      Cover left hallux all times Band-Aid or similar changing daily.      Discussed conservative treatment with shoes of adequate dimensions, material, and style to alleviate symptoms and delay or prevent surgical intervention.     With the patient's permission, I debrided left hallux toenail with a sterile nipper and curette, removing all offending nail and debris.  Patient tolerated the procedure well and related significant relief.            No follow-ups on file.

## 2023-12-18 NOTE — PROGRESS NOTES
"Subjective:      Sakshi Marcial is a 89 y.o. female who returns today regarding her     The patient is s/p cryoablation of a right renal mass in IR on 9/19/19. MRI on 3/16/20 showed "No evidence of recurrent disease.  Treated right renal lesion with expected postprocedural change.  Recommend follow-up exam in approximately 6 months."     FABI on 8/19 showed "Solid right renal mass has decreased in size when compared to prior study dated 07/11/2019 consistent with patient's history of prior cryoablation of right renal mass.  There is a persistent right renal cyst.There appears to be renal cortical thinning bilaterally.2.1 cm left renal cyst."     Doing well! Denies dysuria, frequency and urgency. Denies gross hematuria. She reports occasional left flank pain when she is more active. Denies fever/chills. Denies a history of recurrent UTIs..     No complaints..     Doing great     No flank pain   No hematuria    No symptoms of met disease   .    The following portions of the patient's history were reviewed and updated as appropriate: allergies, current medications, past family history, past medical history, past social history, past surgical history and problem list.    Review of Systems  Pertinent items are noted in HPI.  A comprehensive multipoint review of systems was negative except as otherwise stated in the HPI.    Past Medical History:   Diagnosis Date    Hyperlipidemia     Hypertension     Type 2 diabetes mellitus with chronic kidney disease      Past Surgical History:   Procedure Laterality Date    CRYOABLATION OF LESION OF KIDNEY N/A 9/9/2019    Procedure: CRYOABLATION, LESION, KIDNEY;  Surgeon: Elvie Surgeon;  Location: [facility];  Service: Anesthesiology;  Laterality: N/A;  Room 173/[facility]bert    HYSTERECTOMY         Review of patient's allergies indicates:   Allergen Reactions    Iodine Swelling     Pt reports chin and neck swelling the day after receiving medication.          Objective:   Vitals: There were " no vitals taken for this visit.    Physical Exam   General: alert and oriented, no acute distress  Respiratory: Symmetric expansion, non-labored breathing  Cardiovascular: no peripheral edema  Abdomen: soft, non distended  Skin: normal coloration and turgor, no rashes, no suspicious skin lesions noted  Neuro: no gross deficits  Psych: normal judgment and insight, normal mood/affect, and non-anxious    Physical Exam    Lab Review   Urinalysis demonstrates no specimen    Lab Results   Component Value Date    WBC 4.04 09/08/2020    HGB 12.4 09/08/2020    HCT 38.5 09/08/2020    MCV 98 09/08/2020     09/08/2020     Lab Results   Component Value Date    CREATININE 1.6 (H) 12/28/2022    BUN 32 (H) 12/28/2022       Imaging  MR abdomen  Impression:     Stable post ablation changes in the right kidney.  No definite enhancing renal mass or abdominal metastases.     Electronically signed by resident: Miguel Juarez  Date:                                            01/03/2023  Time:                                           11:36     Electronically signed by: Yunior Soriano  Date:                                            01/03/2023  Time:                                           14:34      XR CHEST PA AND LATERAL     CLINICAL HISTORY:  Malignant neoplasm of left kidney, except renal pelvis     TECHNIQUE:  PA and lateral views of the chest were performed.     COMPARISON:  Non 03/03/2021 e     FINDINGS:  Heart size normal.  The lungs are clear.  No pleural effusion     Impression:     See above        Electronically signed by: Miguel Joe MD  Date:                                            01/03/2023  Time:                                           12:12    Assessment and Plan:   Renal cell carcinoma of right kidney  VIKA*4 years sp ablation        RTC 1 yr with renal US     Stage 3 chronic kidney disease, unspecified whether stage 3a or 3b CKD  Stable  BMP next year  Follow up with PCP +/- nephrology        See Dr Charles  9/2024  with BMP, Ab US, CXR  If OK, we will stop radiographic surveillance

## 2023-12-19 ENCOUNTER — OFFICE VISIT (OUTPATIENT)
Dept: UROLOGY | Facility: CLINIC | Age: 88
End: 2023-12-19
Payer: MEDICARE

## 2023-12-19 VITALS
OXYGEN SATURATION: 97 % | SYSTOLIC BLOOD PRESSURE: 163 MMHG | WEIGHT: 173.19 LBS | HEART RATE: 61 BPM | BODY MASS INDEX: 30.69 KG/M2 | DIASTOLIC BLOOD PRESSURE: 70 MMHG | RESPIRATION RATE: 16 BRPM | HEIGHT: 63 IN

## 2023-12-19 DIAGNOSIS — N18.30 STAGE 3 CHRONIC KIDNEY DISEASE, UNSPECIFIED WHETHER STAGE 3A OR 3B CKD: ICD-10-CM

## 2023-12-19 DIAGNOSIS — C64.1 RENAL CELL CARCINOMA OF RIGHT KIDNEY: Primary | ICD-10-CM

## 2023-12-19 PROCEDURE — 1101F PT FALLS ASSESS-DOCD LE1/YR: CPT | Mod: CPTII,S$GLB,, | Performed by: UROLOGY

## 2023-12-19 PROCEDURE — 1126F AMNT PAIN NOTED NONE PRSNT: CPT | Mod: CPTII,S$GLB,, | Performed by: UROLOGY

## 2023-12-19 PROCEDURE — 1101F PR PT FALLS ASSESS DOC 0-1 FALLS W/OUT INJ PAST YR: ICD-10-PCS | Mod: CPTII,S$GLB,, | Performed by: UROLOGY

## 2023-12-19 PROCEDURE — 99214 PR OFFICE/OUTPT VISIT, EST, LEVL IV, 30-39 MIN: ICD-10-PCS | Mod: S$GLB,,, | Performed by: UROLOGY

## 2023-12-19 PROCEDURE — 3288F FALL RISK ASSESSMENT DOCD: CPT | Mod: CPTII,S$GLB,, | Performed by: UROLOGY

## 2023-12-19 PROCEDURE — 1159F PR MEDICATION LIST DOCUMENTED IN MEDICAL RECORD: ICD-10-PCS | Mod: CPTII,S$GLB,, | Performed by: UROLOGY

## 2023-12-19 PROCEDURE — 1159F MED LIST DOCD IN RCRD: CPT | Mod: CPTII,S$GLB,, | Performed by: UROLOGY

## 2023-12-19 PROCEDURE — 3288F PR FALLS RISK ASSESSMENT DOCUMENTED: ICD-10-PCS | Mod: CPTII,S$GLB,, | Performed by: UROLOGY

## 2023-12-19 PROCEDURE — 99214 OFFICE O/P EST MOD 30 MIN: CPT | Mod: S$GLB,,, | Performed by: UROLOGY

## 2023-12-19 PROCEDURE — 1126F PR PAIN SEVERITY QUANTIFIED, NO PAIN PRESENT: ICD-10-PCS | Mod: CPTII,S$GLB,, | Performed by: UROLOGY

## 2024-05-14 ENCOUNTER — TELEPHONE (OUTPATIENT)
Dept: UROLOGY | Facility: CLINIC | Age: 89
End: 2024-05-14
Payer: MEDICARE

## 2024-05-14 NOTE — TELEPHONE ENCOUNTER
Staff called patient to get scheduled for her 1 year f/u appt with . Patient was already getting assistance with scheduling.

## 2024-08-07 ENCOUNTER — HOSPITAL ENCOUNTER (OUTPATIENT)
Dept: RADIOLOGY | Facility: OTHER | Age: 89
Discharge: HOME | End: 2024-08-07
Attending: UROLOGY
Payer: MEDICARE

## 2024-08-07 DIAGNOSIS — C64.1 RENAL CELL CARCINOMA OF RIGHT KIDNEY: ICD-10-CM

## 2024-08-07 PROCEDURE — 76700 US EXAM ABDOM COMPLETE: CPT | Mod: 26,,, | Performed by: RADIOLOGY

## 2024-08-07 PROCEDURE — 76700 US EXAM ABDOM COMPLETE: CPT | Mod: TC

## 2024-08-07 PROCEDURE — 71046 X-RAY EXAM CHEST 2 VIEWS: CPT | Mod: 26,,, | Performed by: RADIOLOGY

## 2024-08-07 PROCEDURE — 71046 X-RAY EXAM CHEST 2 VIEWS: CPT | Mod: TC,FY

## 2024-12-12 ENCOUNTER — TELEPHONE (OUTPATIENT)
Dept: PODIATRY | Facility: CLINIC | Age: 89
End: 2024-12-12
Payer: MEDICARE

## 2024-12-12 NOTE — TELEPHONE ENCOUNTER
----- Message from Fouzia sent at 12/12/2024  1:22 PM CST -----  Regarding: Ext referral  Good afternoon,    Current pt is being referred to Dr Chinchilla from Dr Stephen Ellington for R great ingrown toenail from prior trauma, needs debridement. I have scanned the referral and records in to media mgr. Please contact pt to schedule and let me know if I can help any further.    Thank you,  Fouzia Rubin  [facility]  Ext [phone]

## 2024-12-18 ENCOUNTER — TELEPHONE (OUTPATIENT)
Dept: PODIATRY | Facility: CLINIC | Age: 89
End: 2024-12-18
Payer: MEDICARE

## 2024-12-18 NOTE — TELEPHONE ENCOUNTER
----- Message from Jena sent at 12/18/2024  4:18 PM CST -----  Contact: [phone]  Patient is calling to reschedule appointment. Please call to assist.  
Staff tried to contact patient, no answer, left a message on voice mail informing patient to contact the office at [phone] for rescheduling or she can reschedule thru the portal.  
nonspecific STT abn

## 2025-01-02 ENCOUNTER — OFFICE VISIT (OUTPATIENT)
Dept: PODIATRY | Facility: CLINIC | Age: OVER 89
End: 2025-01-02
Payer: MEDICARE

## 2025-01-02 VITALS
BODY MASS INDEX: 30.66 KG/M2 | HEART RATE: 68 BPM | SYSTOLIC BLOOD PRESSURE: 190 MMHG | DIASTOLIC BLOOD PRESSURE: 66 MMHG | WEIGHT: 173.06 LBS | HEIGHT: 63 IN

## 2025-01-02 DIAGNOSIS — M79.672 FOOT PAIN, LEFT: Primary | ICD-10-CM

## 2025-01-02 DIAGNOSIS — I73.9 PAD (PERIPHERAL ARTERY DISEASE): ICD-10-CM

## 2025-01-02 DIAGNOSIS — M77.9 ENTHESOPATHY: ICD-10-CM

## 2025-01-02 PROCEDURE — 99999 PR PBB SHADOW E&M-EST. PATIENT-LVL III: CPT | Mod: PBBFAC,,, | Performed by: PODIATRIST

## 2025-01-02 RX ORDER — CICLOPIROX 80 MG/ML
SOLUTION TOPICAL NIGHTLY
Qty: 6.6 ML | Refills: 11 | Status: SHIPPED | OUTPATIENT
Start: 2025-01-02

## 2025-01-02 RX ORDER — LIDOCAINE AND PRILOCAINE 25; 25 MG/G; MG/G
CREAM TOPICAL
Qty: 30 G | Refills: 3 | Status: SHIPPED | OUTPATIENT
Start: 2025-01-02

## 2025-01-02 RX ORDER — TOBRAMYCIN 3 MG/ML
SOLUTION/ DROPS OPHTHALMIC
Qty: 5 ML | Refills: 3 | Status: SHIPPED | OUTPATIENT
Start: 2025-01-02

## 2025-01-02 NOTE — PROGRESS NOTES
Subjective:      Patient ID: Sakshi Marcial is a 89 y.o. female.    Chief Complaint: Ingrown Toenail (Bilateral great toe)    Sharp throbbing pain both big toe/nails.  Chronic condition present on and off for more than a year.  This exacerbations been gradual onset, worsening over the past week or so.  Aggravated by socks shoes pressure ambulation.  Prior medical treatment with trimming and topical antibiotics as worked well in the past for long periods of time satisfaction.      Denies trauma and surgery both feet.    Cc2  thick discolored misshapen toenails.  Chronic condition present for years.  Gradual onset worsening over the past several weeks.  Aggravated by socks shoes pressure ambulation.  Periodic debridement help symptoms-potentially at [facility] in the past asking about changing care to our location.  Denies trauma and surgery both feet.  No medical treatment thus far.    Chief Complaint   Patient presents with    Ingrown Toenail     Bilateral great toe       Casual shoes both feet  .       No data to display                89 y.o.    Needs review if Total Development score is :  Below 10 (2 year 5 month old)  Below 11 (2 year 6 month old)  Below 12 (2 year 7 month old)  Below 13 (2 year 8 month old)  Below 14 (2 year 9-10 month old)      Review of Systems   Constitutional: Negative for chills, diaphoresis, fever, malaise/fatigue and night sweats.   Cardiovascular:  Positive for leg swelling. Negative for claudication, cyanosis and syncope.   Skin:  Positive for nail changes. Negative for color change, dry skin, rash, suspicious lesions and unusual hair distribution.   Musculoskeletal:  Negative for falls, joint pain, joint swelling, muscle cramps, muscle weakness and stiffness.   Gastrointestinal:  Negative for constipation, diarrhea, nausea and vomiting.   Neurological:  Positive for paresthesias and sensory change. Negative for brief paralysis, disturbances in coordination, focal weakness, numbness  and tremors.         Objective:      Physical Exam  Constitutional:       General: She is not in acute distress.     Appearance: She is well-developed. She is not diaphoretic.   Cardiovascular:      Pulses:           Popliteal pulses are 2+ on the right side and 2+ on the left side.        Dorsalis pedis pulses are 2+ on the right side and 2+ on the left side.        Posterior tibial pulses are 2+ on the right side and 2+ on the left side.      Comments: Capillary refill 3 seconds all toes/distal feet, all toes/both feet warm to touch.      Negative lymphadenopathy bilateral popliteal fossa and tarsal tunnel.      Negavie lower extremity edema bilateral.    Musculoskeletal:      Right ankle: No swelling, deformity, ecchymosis or lacerations. Normal range of motion. Normal pulse.      Right Achilles Tendon: Normal. No defects. Skinner's test negative.      Comments: Normal angle, base, station of gait. All ten toes without clubbing, cyanosis, or signs of ischemia.  No pain to palpation bilateral lower extremities.  Range of motion, stability, muscle strength, and muscle tone normal bilateral feet and legs.    Lymphadenopathy:      Lower Body: No right inguinal adenopathy. No left inguinal adenopathy.      Comments: Negative lymphadenopathy bilateral popliteal fossa and tarsal tunnel.    Negative lymphangitic streaking bilateral feet/ankles/legs.   Skin:     General: Skin is warm and dry.      Capillary Refill: Capillary refill takes 2 to 3 seconds.      Coloration: Skin is not pale.      Findings: No abrasion, bruising, burn, ecchymosis, erythema, laceration, lesion or rash.      Nails: There is no clubbing.      Comments: Skin thin, shiny, atrophic, with decreased density and distribution of pedal hair bilateral, but without hyperpigmentation, vu discoloration,  ulcers, masses, nodules or cords palpated bilateral feet and legs.    Toenails 1st, 2nd, 3rd, 4th, 5th  bilateral are hypertrophic thickened 2-3 mm,  dystrophic, discolored tanish brown with tan, gray crumbly subungual debris.  Tender to distal nail plate pressure, without periungual skin abnormality of each.      Neurological:      Mental Status: She is alert and oriented to person, place, and time.      Sensory: No sensory deficit.      Motor: No tremor, atrophy or abnormal muscle tone.      Gait: Gait normal.      Comments: Paresthesias, and burning bilateral feet with no clearly identified trigger or source.     Psychiatric:         Behavior: Behavior is cooperative.           Assessment:       No diagnosis found.      Plan:       There are no diagnoses linked to this encounter.  I counseled the patient on her conditions, their implications and medical management.        The patient has received literature on basic diabetic foot care.  Patient will inspect feet daily, wear protective shoe gear when ambulatory, and apply moisturizer to skin as needed to maintain elasticity and help prevent ulceration.    Inspect feet multiple times daily for signs of occurrence/recurrence ulceration.    With the patient's permission, I debrided all ten toenails with a sterile nipper and curette, removing all offending nail and debris.  Patient tolerated the procedure well and related significant relief.    Tobramycin drops bid both hallux.    Cover both hallux all times with Band-Aid or similar changing daily until completely healed.      Discussed conservative treatment with shoes of adequate dimensions, material, and style to alleviate symptoms and delay or prevent surgical intervention.           Follow up in about 1 year (around 1/2/2026).

## 2025-01-06 ENCOUNTER — TELEPHONE (OUTPATIENT)
Dept: UROLOGY | Facility: CLINIC | Age: OVER 89
End: 2025-01-06
Payer: MEDICARE

## 2025-01-07 DIAGNOSIS — Z85.528 HISTORY OF RENAL CARCINOMA: Primary | ICD-10-CM

## 2025-01-15 ENCOUNTER — HOSPITAL ENCOUNTER (OUTPATIENT)
Dept: RADIOLOGY | Facility: OTHER | Age: OVER 89
Discharge: HOME | End: 2025-01-15
Attending: NURSE PRACTITIONER
Payer: MEDICARE

## 2025-01-15 DIAGNOSIS — Z85.528 HISTORY OF RENAL CARCINOMA: ICD-10-CM

## 2025-01-15 PROCEDURE — 76700 US EXAM ABDOM COMPLETE: CPT | Mod: TC

## 2025-01-15 PROCEDURE — 76700 US EXAM ABDOM COMPLETE: CPT | Mod: 26,,, | Performed by: RADIOLOGY

## 2025-02-05 ENCOUNTER — TELEPHONE (OUTPATIENT)
Dept: UROLOGY | Facility: CLINIC | Age: OVER 89
End: 2025-02-05
Payer: MEDICARE

## 2025-02-07 ENCOUNTER — TELEPHONE (OUTPATIENT)
Dept: UROLOGY | Facility: CLINIC | Age: OVER 89
End: 2025-02-07
Payer: MEDICARE

## 2025-02-07 NOTE — TELEPHONE ENCOUNTER
----- Message from Shruti sent at 2/6/2025  4:37 PM CST -----  Regarding: Requesting a call  Name of Who is Calling:Daniele            What is the request in detail:Pt is requesting a call because she needs to speak to someone in clinic because she needs an appointment as soon as possible.           Can the clinic reply by [facility]:No            What Number to Call Back if not in [facility]:[phone]

## 2025-05-06 NOTE — PROGRESS NOTES
Subjective:      Sakshi Marcial is a 89 y.o. female who returns today regarding her     Doing great!    Very active    No hematuria  No flank pain  No symptoms of metastatic disease.    The following portions of the patient's history were reviewed and updated as appropriate: allergies, current medications, past family history, past medical history, past social history, past surgical history and problem list.    Review of Systems  Pertinent items are noted in HPI.  A comprehensive multipoint review of systems was negative except as otherwise stated in the HPI.    Past Medical History:   Diagnosis Date    Hyperlipidemia     Hypertension     Type 2 diabetes mellitus with chronic kidney disease      Past Surgical History:   Procedure Laterality Date    CRYOABLATION OF LESION OF KIDNEY N/A 9/9/2019    Procedure: CRYOABLATION, LESION, KIDNEY;  Surgeon: Elvie Surgeon;  Location: [facility];  Service: Anesthesiology;  Laterality: N/A;  Room 173/[place]    HYSTERECTOMY         Review of patient's allergies indicates:   Allergen Reactions    Iodine Swelling     Pt reports chin and neck swelling the day after receiving medication.          Objective:   Vitals: There were no vitals taken for this visit.    Physical Exam   General: alert and oriented, no acute distress  Respiratory: Symmetric expansion, non-labored breathing  Cardiovascular: no peripheral edema  Abdomen: non distended  Skin: normal coloration and turgor, no rashes, no suspicious skin lesions noted  Neuro: no gross deficits  Psych: normal judgment and insight, normal mood/affect, and non-anxious    Physical Exam    Lab Review   Urinalysis demonstrates no specimen    Lab Results   Component Value Date    WBC 4.04 09/08/2020    HGB 12.4 09/08/2020    HCT 38.5 09/08/2020    MCV 98 09/08/2020     09/08/2020     Lab Results   Component Value Date    CREATININE 1.6 (H) 12/28/2022    BUN 32 (H) 12/28/2022       Imaging  US ABDOMEN COMPLETE     CLINICAL  HISTORY:  Personal history of other malignant neoplasm of kidney     TECHNIQUE:  Complete abdominal ultrasound (including pancreas, aorta, liver, gallbladder, common bile duct, IVC, kidneys, and spleen) was performed.     COMPARISON:  08/07/2024     FINDINGS:  Pancreas: The visualized portions of pancreas appear normal.     Aorta: No aneurysm.     Liver: 12.7 cm, normal in size. Homogeneous parenchymal echotexture. No focal lesions.     Gallbladder: No calculi, wall thickening, or pericholecystic fluid.  Negative sonographic Millan's sign.     Biliary system: 7-8 mm common bile duct.  No intrahepatic ductal dilatation.     Inferior vena cava: Normal in appearance.     Right kidney: 7.4 cm. No hydronephrosis.  Solid lower pole mass measures 2 x 1.8 x 1.8 cm (previously 2.2 x 1.9 x 2.1 cm).  No significant vascularity on color images.  Adjacent cyst measures 3 cm, stable.     Left kidney: 8.8 cm. No hydronephrosis.  Upper pole cyst, 2.7 cm, stable.     Spleen: 8.1 cm.  Normal in size with homogeneous echotexture.     Miscellaneous: No ascites.     Impression:     Solid exophytic lesion lower pole right kidney is stable to mildly decreased in size consistent with treated renal cell carcinoma.  No new mass.     Stable simple renal cysts.     Common duct is mildly dilated, 7-8 mm.  No intrahepatic bile duct dilatation.  Please correlate with laboratory values.        Electronically signed by:Darlene Benitez  Date:                                            01/15/2025  Time:                                           10:26  Assessment and Plan:   Renal cell carcinoma of right kidney  VIKA*5 years sp ablation     OK to stop radiographic surveillance  RTC prn       Stage 3 chronic kidney disease, unspecified whether stage 3a or 3b CKD  Follow up with PCP

## 2025-05-08 ENCOUNTER — OFFICE VISIT (OUTPATIENT)
Dept: UROLOGY | Facility: CLINIC | Age: OVER 89
End: 2025-05-08
Payer: MEDICARE

## 2025-05-08 ENCOUNTER — TELEPHONE (OUTPATIENT)
Dept: UROLOGY | Facility: CLINIC | Age: OVER 89
End: 2025-05-08
Payer: MEDICARE

## 2025-05-08 VITALS
DIASTOLIC BLOOD PRESSURE: 77 MMHG | WEIGHT: 176.13 LBS | HEART RATE: 77 BPM | SYSTOLIC BLOOD PRESSURE: 181 MMHG | BODY MASS INDEX: 31.2 KG/M2

## 2025-05-08 DIAGNOSIS — C64.1 RENAL CELL CARCINOMA OF RIGHT KIDNEY: Primary | ICD-10-CM

## 2025-05-08 PROCEDURE — 3288F FALL RISK ASSESSMENT DOCD: CPT | Mod: CPTII,S$GLB,, | Performed by: UROLOGY

## 2025-05-08 PROCEDURE — 99214 OFFICE O/P EST MOD 30 MIN: CPT | Mod: S$GLB,,, | Performed by: UROLOGY

## 2025-05-08 PROCEDURE — 1126F AMNT PAIN NOTED NONE PRSNT: CPT | Mod: CPTII,S$GLB,, | Performed by: UROLOGY

## 2025-05-08 PROCEDURE — 1159F MED LIST DOCD IN RCRD: CPT | Mod: CPTII,S$GLB,, | Performed by: UROLOGY

## 2025-05-08 PROCEDURE — 1101F PT FALLS ASSESS-DOCD LE1/YR: CPT | Mod: CPTII,S$GLB,, | Performed by: UROLOGY

## 2025-05-08 RX ORDER — ERGOCALCIFEROL 1.25 MG/1
50000 CAPSULE ORAL
COMMUNITY
Start: 2024-12-05

## 2025-05-08 RX ORDER — FUROSEMIDE 20 MG/1
20 TABLET ORAL DAILY PRN
COMMUNITY
Start: 2025-03-31